# Patient Record
Sex: FEMALE | Race: WHITE | Employment: FULL TIME | URBAN - METROPOLITAN AREA
[De-identification: names, ages, dates, MRNs, and addresses within clinical notes are randomized per-mention and may not be internally consistent; named-entity substitution may affect disease eponyms.]

---

## 2021-11-02 ENCOUNTER — OFFICE VISIT (OUTPATIENT)
Dept: FAMILY MEDICINE CLINIC | Age: 31
End: 2021-11-02
Payer: COMMERCIAL

## 2021-11-02 VITALS
SYSTOLIC BLOOD PRESSURE: 122 MMHG | TEMPERATURE: 97.9 F | WEIGHT: 188 LBS | DIASTOLIC BLOOD PRESSURE: 82 MMHG | BODY MASS INDEX: 31.32 KG/M2 | OXYGEN SATURATION: 98 % | HEART RATE: 79 BPM | HEIGHT: 65 IN | RESPIRATION RATE: 18 BRPM

## 2021-11-02 DIAGNOSIS — Z76.89 ENCOUNTER TO ESTABLISH CARE WITH NEW DOCTOR: ICD-10-CM

## 2021-11-02 DIAGNOSIS — F41.9 MODERATE ANXIETY: ICD-10-CM

## 2021-11-02 DIAGNOSIS — R79.89 LOW VITAMIN D LEVEL: ICD-10-CM

## 2021-11-02 DIAGNOSIS — F32.A MODERATELY SEVERE DEPRESSION: Primary | ICD-10-CM

## 2021-11-02 DIAGNOSIS — R10.9 ABDOMINAL PAIN, UNSPECIFIED ABDOMINAL LOCATION: ICD-10-CM

## 2021-11-02 LAB
ALBUMIN SERPL-MCNC: 5 G/DL (ref 3.5–5.2)
ALP BLD-CCNC: 91 U/L (ref 35–104)
ALT SERPL-CCNC: 16 U/L (ref 0–32)
ANION GAP SERPL CALCULATED.3IONS-SCNC: 16 MMOL/L (ref 7–16)
AST SERPL-CCNC: 22 U/L (ref 0–31)
BILIRUB SERPL-MCNC: 0.4 MG/DL (ref 0–1.2)
BUN BLDV-MCNC: 11 MG/DL (ref 6–20)
CALCIUM SERPL-MCNC: 9.7 MG/DL (ref 8.6–10.2)
CHLORIDE BLD-SCNC: 101 MMOL/L (ref 98–107)
CHOLESTEROL, TOTAL: 243 MG/DL (ref 0–199)
CO2: 20 MMOL/L (ref 22–29)
CREAT SERPL-MCNC: 0.7 MG/DL (ref 0.5–1)
GFR AFRICAN AMERICAN: >60
GFR NON-AFRICAN AMERICAN: >60 ML/MIN/1.73
GLUCOSE BLD-MCNC: 114 MG/DL (ref 74–99)
HBA1C MFR BLD: 5.3 % (ref 4–5.6)
HCT VFR BLD CALC: 47.9 % (ref 34–48)
HDLC SERPL-MCNC: 37 MG/DL
HEMOGLOBIN: 16.3 G/DL (ref 11.5–15.5)
LDL CHOLESTEROL CALCULATED: 181 MG/DL (ref 0–99)
MCH RBC QN AUTO: 33.2 PG (ref 26–35)
MCHC RBC AUTO-ENTMCNC: 34 % (ref 32–34.5)
MCV RBC AUTO: 97.6 FL (ref 80–99.9)
PDW BLD-RTO: 11.9 FL (ref 11.5–15)
PLATELET # BLD: 362 E9/L (ref 130–450)
PMV BLD AUTO: 10.6 FL (ref 7–12)
POTASSIUM SERPL-SCNC: 5.2 MMOL/L (ref 3.5–5)
RBC # BLD: 4.91 E12/L (ref 3.5–5.5)
SODIUM BLD-SCNC: 137 MMOL/L (ref 132–146)
TOTAL PROTEIN: 7.3 G/DL (ref 6.4–8.3)
TRIGL SERPL-MCNC: 126 MG/DL (ref 0–149)
TSH SERPL DL<=0.05 MIU/L-ACNC: 1.17 UIU/ML (ref 0.27–4.2)
VITAMIN D 25-HYDROXY: 29 NG/ML (ref 30–100)
VLDLC SERPL CALC-MCNC: 25 MG/DL
WBC # BLD: 9.9 E9/L (ref 4.5–11.5)

## 2021-11-02 PROCEDURE — 36415 COLL VENOUS BLD VENIPUNCTURE: CPT | Performed by: FAMILY MEDICINE

## 2021-11-02 PROCEDURE — G8417 CALC BMI ABV UP PARAM F/U: HCPCS | Performed by: FAMILY MEDICINE

## 2021-11-02 PROCEDURE — 4004F PT TOBACCO SCREEN RCVD TLK: CPT | Performed by: FAMILY MEDICINE

## 2021-11-02 PROCEDURE — 99204 OFFICE O/P NEW MOD 45 MIN: CPT | Performed by: FAMILY MEDICINE

## 2021-11-02 PROCEDURE — G8484 FLU IMMUNIZE NO ADMIN: HCPCS | Performed by: FAMILY MEDICINE

## 2021-11-02 PROCEDURE — G8427 DOCREV CUR MEDS BY ELIG CLIN: HCPCS | Performed by: FAMILY MEDICINE

## 2021-11-02 RX ORDER — ETONOGESTREL 68 MG/1
68 IMPLANT SUBCUTANEOUS ONCE
COMMUNITY

## 2021-11-02 RX ORDER — HYDROXYZINE PAMOATE 25 MG/1
25 CAPSULE ORAL 2 TIMES DAILY PRN
Qty: 60 CAPSULE | Refills: 0 | Status: SHIPPED
Start: 2021-11-02 | End: 2021-12-02

## 2021-11-02 SDOH — ECONOMIC STABILITY: FOOD INSECURITY: WITHIN THE PAST 12 MONTHS, YOU WORRIED THAT YOUR FOOD WOULD RUN OUT BEFORE YOU GOT MONEY TO BUY MORE.: NEVER TRUE

## 2021-11-02 SDOH — ECONOMIC STABILITY: FOOD INSECURITY: WITHIN THE PAST 12 MONTHS, THE FOOD YOU BOUGHT JUST DIDN'T LAST AND YOU DIDN'T HAVE MONEY TO GET MORE.: NEVER TRUE

## 2021-11-02 ASSESSMENT — ENCOUNTER SYMPTOMS
ABDOMINAL PAIN: 0
SHORTNESS OF BREATH: 0
COUGH: 0
VOMITING: 0
NAUSEA: 1
CHEST TIGHTNESS: 0
CONSTIPATION: 1
ABDOMINAL DISTENTION: 0
COLOR CHANGE: 0
BACK PAIN: 0
SINUS PRESSURE: 0
BLOOD IN STOOL: 0
EYE DISCHARGE: 0
RHINORRHEA: 0
WHEEZING: 0
SORE THROAT: 0
EYE PAIN: 0
DIARRHEA: 1

## 2021-11-02 ASSESSMENT — PATIENT HEALTH QUESTIONNAIRE - PHQ9
2. FEELING DOWN, DEPRESSED OR HOPELESS: 2
5. POOR APPETITE OR OVEREATING: 3
SUM OF ALL RESPONSES TO PHQ QUESTIONS 1-9: 17
9. THOUGHTS THAT YOU WOULD BE BETTER OFF DEAD, OR OF HURTING YOURSELF: 0
3. TROUBLE FALLING OR STAYING ASLEEP: 3
SUM OF ALL RESPONSES TO PHQ QUESTIONS 1-9: 17
1. LITTLE INTEREST OR PLEASURE IN DOING THINGS: 2
SUM OF ALL RESPONSES TO PHQ QUESTIONS 1-9: 17
10. IF YOU CHECKED OFF ANY PROBLEMS, HOW DIFFICULT HAVE THESE PROBLEMS MADE IT FOR YOU TO DO YOUR WORK, TAKE CARE OF THINGS AT HOME, OR GET ALONG WITH OTHER PEOPLE: 2
4. FEELING TIRED OR HAVING LITTLE ENERGY: 3
7. TROUBLE CONCENTRATING ON THINGS, SUCH AS READING THE NEWSPAPER OR WATCHING TELEVISION: 0
SUM OF ALL RESPONSES TO PHQ9 QUESTIONS 1 & 2: 4
8. MOVING OR SPEAKING SO SLOWLY THAT OTHER PEOPLE COULD HAVE NOTICED. OR THE OPPOSITE, BEING SO FIGETY OR RESTLESS THAT YOU HAVE BEEN MOVING AROUND A LOT MORE THAN USUAL: 3
6. FEELING BAD ABOUT YOURSELF - OR THAT YOU ARE A FAILURE OR HAVE LET YOURSELF OR YOUR FAMILY DOWN: 1

## 2021-11-02 ASSESSMENT — COLUMBIA-SUICIDE SEVERITY RATING SCALE - C-SSRS
6. HAVE YOU EVER DONE ANYTHING, STARTED TO DO ANYTHING, OR PREPARED TO DO ANYTHING TO END YOUR LIFE?: NO
1. WITHIN THE PAST MONTH, HAVE YOU WISHED YOU WERE DEAD OR WISHED YOU COULD GO TO SLEEP AND NOT WAKE UP?: NO
2. HAVE YOU ACTUALLY HAD ANY THOUGHTS OF KILLING YOURSELF?: NO

## 2021-11-02 ASSESSMENT — SOCIAL DETERMINANTS OF HEALTH (SDOH): HOW HARD IS IT FOR YOU TO PAY FOR THE VERY BASICS LIKE FOOD, HOUSING, MEDICAL CARE, AND HEATING?: NOT HARD AT ALL

## 2021-11-02 NOTE — PROGRESS NOTES
[facility]        SUBJECTIVE:  CC: had concerns including New Patient (Pt here to establish care with PCP - Last PCP at [facility], does not recall name), Mass (Pt c/o lump on her RLQ, noted x1 week ago, only painful when applying pressure), and Depression (PHQ-9 score 17 (Pt's son is Autistic which she feels does not help with some of these symptoms)). HPI:  Darcia Boxer is a female 33 y.o. presented to the clinic to establish care. 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 She states there has been 8 deaths in the family in the past two years. This summer she really noticed her mood was bad, crying all the time. She usually does a yearly garden. She got it planted, but never tended to it. She reports biting her nails since she had teeth. She denies and s/h ideations. She doesn't see a counselor and has never been on medications. She reports finding a lump on her abdomen approximately a week ago. It was painful in the past, but denies pain now. She reports sometimes she gets pain with certain movements. She says her bowels are \"normal for me\" notating that she has one every other day. Her bowel movements vary from constipation to diarrhea. She denies food types affecting it. She notes having some bloating. This has all been going on since delivering her son in 2016. She gets mild episodic nausea. She trys to drink \"3 big water bottles a day. \" She reports it is what it is and states that she is a \"horrible patient\". Review of Systems   Constitutional: Negative for activity change, appetite change, fatigue and fever. HENT: Negative for congestion, postnasal drip, rhinorrhea, sinus pressure, sneezing and sore throat. Eyes: Negative for pain and discharge. Respiratory: Negative for cough, chest tightness, shortness of breath and wheezing. Cardiovascular: Negative for chest pain, palpitations and leg swelling.    Gastrointestinal: Positive for constipation, diarrhea and nausea. Negative for abdominal distention, abdominal pain, blood in stool and vomiting. Abdominal lump   Endocrine: Negative for cold intolerance and heat intolerance. Genitourinary: Negative for decreased urine volume, dysuria, frequency and urgency. Musculoskeletal: Negative for arthralgias and back pain. Skin: Negative for color change and rash. Allergic/Immunologic: Negative for food allergies and immunocompromised state. Neurological: Negative for dizziness, syncope, weakness, numbness and headaches. Psychiatric/Behavioral: Negative for agitation, behavioral problems and suicidal ideas. The patient is nervous/anxious. Depression       Outpatient Medications Marked as Taking for the 11/2/21 encounter (Office Visit) with Angelica Baptiste MD   Medication Sig Dispense Refill    etonogestrel (NEXPLANON) 68 MG implant 68 mg by Subdermal route once      sertraline (ZOLOFT) 50 MG tablet Take 1 tablet by mouth daily 90 tablet 1    hydrOXYzine (VISTARIL) 25 MG capsule Take 1 capsule by mouth 2 times daily as needed for Anxiety 60 capsule 0       I have reviewed all pertinent PMHx, PSHx, FamHx, SocialHx, medications, and allergies and updated history as appropriate. OBJECTIVE    VS: /82   Pulse 79   Temp 97.9 °F (36.6 °C) (Temporal)   Resp 18   Ht 5' 5\" (1.651 m)   Wt 188 lb (85.3 kg)   SpO2 98%   Breastfeeding No   BMI 31.28 kg/m²   Physical Exam  Constitutional:       General: She is not in acute distress. Appearance: She is well-developed. She is obese. She is not diaphoretic. HENT:      Head: Normocephalic and atraumatic. Right Ear: External ear normal.      Left Ear: External ear normal.   Eyes:      Conjunctiva/sclera: Conjunctivae normal.      Pupils: Pupils are equal, round, and reactive to light. Cardiovascular:      Rate and Rhythm: Normal rate and regular rhythm. Heart sounds: Normal heart sounds.  No murmur heard. No friction rub. No gallop. Pulmonary:      Effort: Pulmonary effort is normal.      Breath sounds: Normal breath sounds. Abdominal:      General: Bowel sounds are normal. There is no distension. Palpations: Abdomen is soft. There is no mass. Tenderness: There is abdominal tenderness. There is no right CVA tenderness, left CVA tenderness, guarding or rebound. Hernia: No hernia is present. Comments: Small lump right pelvis query cyst vs.lipoma. Right mcburney originally positive, but then negative on repeat. Negative Psoas and Rovsing sign   Musculoskeletal:         General: Normal range of motion. Cervical back: Normal range of motion and neck supple. Skin:     General: Skin is warm and dry. Neurological:      Mental Status: She is alert and oriented to person, place, and time. Psychiatric:         Behavior: Behavior normal.       ASSESSMENT/PLAN:  1. Moderately severe depression (HCC)  - sertraline (ZOLOFT) 50 MG tablet; Take 1 tablet by mouth daily  Dispense: 90 tablet; Refill: 1  - hydrOXYzine (VISTARIL) 25 MG capsule; Take 1 capsule by mouth 2 times daily as needed for Anxiety  Dispense: 60 capsule; Refill: 0    2. Moderate anxiety  #1-2 initiate Zoloft with prn Vistaril. Encourage patient to establish with a counselor. No s/h ideations. - sertraline (ZOLOFT) 50 MG tablet; Take 1 tablet by mouth daily  Dispense: 90 tablet; Refill: 1  - hydrOXYzine (VISTARIL) 25 MG capsule; Take 1 capsule by mouth 2 times daily as needed for Anxiety  Dispense: 60 capsule; Refill: 0    3. BMI 31.0-31.9,adult  Work on weight reduction with caloric restriction/well balanced diet and exercise 150 min/week as tolerated. Baseline labs as below. - CBC; Future  - Comprehensive Metabolic Panel; Future  - Hemoglobin A1C; Future  - Lipid Panel; Future  - TSH without Reflex; Future  - Vitamin D 25 Hydroxy; Future    4. Low vitamin D level  - Vitamin D 25 Hydroxy; Future    5.  Encounter to establish care with new doctor    6. Abdominal pain, unspecified abdominal location  - CTA ABDOMEN PELVIS W CONTRAST; Future    I have reviewed my findings and recommendations with Sandrita Barber MD  11/22/2021 8:55 AM  Return in about 4 weeks (around 11/30/2021). Counseled regarding above diagnosis, including possible risks and complications, especially if left uncontrolled. Patient counseled on red flag symptoms and if they occur to go to the ED. Discussed medications risk/benefits and possible side effects and alternatives to treatment. Patient and/or guardian verbalizes understanding, agrees, feels comfortable with and wishes to proceed with above treatment plan. Advised patient regarding importance of keeping up with recommended health maintenance and to schedule as soon as possible if overdue, as this is important in assessing for undiagnosed pathology, especially cancer, as well as protecting against potentially harmful/life threatening disease. Patient and/or guardian verbalizes understanding and agrees with above counseling, assessment and plan. All questions answered. Please note this report has been partially produced using speech recognition software  and may contain errors related to that system including grammar, punctuation and spelling as well as words and phrases that may seem inappropriate. If there are questions or concerns please feel free to contact me to clarify.

## 2021-11-07 ENCOUNTER — TELEPHONE (OUTPATIENT)
Dept: ADMINISTRATIVE | Age: 31
End: 2021-11-07

## 2021-11-10 ENCOUNTER — HOSPITAL ENCOUNTER (OUTPATIENT)
Dept: CT IMAGING | Age: 31
Discharge: HOME | End: 2021-11-12
Payer: COMMERCIAL

## 2021-11-10 DIAGNOSIS — R10.9 ABDOMINAL PAIN, UNSPECIFIED ABDOMINAL LOCATION: ICD-10-CM

## 2021-11-10 PROCEDURE — 74174 CTA ABD&PLVS W/CONTRAST: CPT

## 2021-11-10 PROCEDURE — 6360000004 HC RX CONTRAST MEDICATION: Performed by: RADIOLOGY

## 2021-11-10 PROCEDURE — 2580000003 HC RX 258: Performed by: RADIOLOGY

## 2021-11-10 RX ORDER — SODIUM CHLORIDE 0.9 % (FLUSH) 0.9 %
10 SYRINGE (ML) INJECTION PRN
Status: DISCONTINUED | OUTPATIENT
Start: 2021-11-10 | End: 2021-11-13 | Stop reason: HOSPADM

## 2021-11-10 RX ADMIN — IOPAMIDOL 100 ML: 755 INJECTION, SOLUTION INTRAVENOUS at 10:29

## 2021-11-10 RX ADMIN — SODIUM CHLORIDE, PRESERVATIVE FREE 10 ML: 5 INJECTION INTRAVENOUS at 10:29

## 2021-12-02 ENCOUNTER — OFFICE VISIT (OUTPATIENT)
Dept: FAMILY MEDICINE CLINIC | Age: 31
End: 2021-12-02
Payer: COMMERCIAL

## 2021-12-02 VITALS
HEART RATE: 77 BPM | OXYGEN SATURATION: 97 % | WEIGHT: 186 LBS | TEMPERATURE: 97.9 F | HEIGHT: 65 IN | BODY MASS INDEX: 30.99 KG/M2 | DIASTOLIC BLOOD PRESSURE: 68 MMHG | SYSTOLIC BLOOD PRESSURE: 122 MMHG | RESPIRATION RATE: 16 BRPM

## 2021-12-02 DIAGNOSIS — K58.2 IRRITABLE BOWEL SYNDROME WITH CONSTIPATION AND DIARRHEA: ICD-10-CM

## 2021-12-02 DIAGNOSIS — E55.9 VITAMIN D DEFICIENCY: ICD-10-CM

## 2021-12-02 DIAGNOSIS — K76.0 FATTY LIVER: ICD-10-CM

## 2021-12-02 DIAGNOSIS — Z83.79 FAMILY HISTORY OF COLITIS: ICD-10-CM

## 2021-12-02 DIAGNOSIS — K58.2 IRRITABLE BOWEL SYNDROME WITH CONSTIPATION AND DIARRHEA: Primary | ICD-10-CM

## 2021-12-02 DIAGNOSIS — Z82.49 FAMILY HISTORY OF HEART DISEASE: ICD-10-CM

## 2021-12-02 DIAGNOSIS — F41.9 MODERATE ANXIETY: ICD-10-CM

## 2021-12-02 DIAGNOSIS — G43.109 MIGRAINE WITH AURA, NOT INTRACTABLE, WITHOUT STATUS MIGRAINOSUS: ICD-10-CM

## 2021-12-02 DIAGNOSIS — Z80.8 FAMILY HISTORY OF MELANOMA: ICD-10-CM

## 2021-12-02 DIAGNOSIS — F32.A MODERATELY SEVERE DEPRESSION: ICD-10-CM

## 2021-12-02 DIAGNOSIS — E78.2 MIXED HYPERLIPIDEMIA: ICD-10-CM

## 2021-12-02 DIAGNOSIS — F12.90 MARIJUANA USE: ICD-10-CM

## 2021-12-02 DIAGNOSIS — F17.200 NICOTINE DEPENDENCE, UNCOMPLICATED, UNSPECIFIED NICOTINE PRODUCT TYPE: ICD-10-CM

## 2021-12-02 DIAGNOSIS — Z82.49 FAMILY HISTORY OF ANEURYSM: ICD-10-CM

## 2021-12-02 DIAGNOSIS — Z87.39 H/O DEGENERATIVE DISC DISEASE: ICD-10-CM

## 2021-12-02 PROCEDURE — 99215 OFFICE O/P EST HI 40 MIN: CPT | Performed by: FAMILY MEDICINE

## 2021-12-02 PROCEDURE — 4004F PT TOBACCO SCREEN RCVD TLK: CPT | Performed by: FAMILY MEDICINE

## 2021-12-02 PROCEDURE — G8484 FLU IMMUNIZE NO ADMIN: HCPCS | Performed by: FAMILY MEDICINE

## 2021-12-02 PROCEDURE — G8427 DOCREV CUR MEDS BY ELIG CLIN: HCPCS | Performed by: FAMILY MEDICINE

## 2021-12-02 PROCEDURE — G8417 CALC BMI ABV UP PARAM F/U: HCPCS | Performed by: FAMILY MEDICINE

## 2021-12-02 RX ORDER — CYANOCOBALAMIN 1000 UG/ML
1000 INJECTION INTRAMUSCULAR; SUBCUTANEOUS ONCE
COMMUNITY
End: 2021-12-02 | Stop reason: CLARIF

## 2021-12-02 ASSESSMENT — ENCOUNTER SYMPTOMS
VOMITING: 0
DIARRHEA: 1
WHEEZING: 0
COUGH: 0
NAUSEA: 0
ABDOMINAL PAIN: 0
CONSTIPATION: 1
BLOOD IN STOOL: 0
SHORTNESS OF BREATH: 0

## 2021-12-02 NOTE — PROGRESS NOTES
Conjunctiva/sclera: Conjunctivae normal.      Pupils: Pupils are equal, round, and reactive to light. Cardiovascular:      Rate and Rhythm: Normal rate and regular rhythm. Pulmonary:      Effort: Pulmonary effort is normal.      Breath sounds: Normal breath sounds. Abdominal:      General: Bowel sounds are normal. There is no distension. Palpations: Abdomen is soft. There is no mass. Tenderness: There is no abdominal tenderness. There is no guarding or rebound. Hernia: No hernia is present. Musculoskeletal:      Cervical back: Normal range of motion and neck supple. Skin:     General: Skin is warm and dry. Neurological:      Mental Status: She is alert and oriented to person, place, and time. ASSESSMENT/PLAN:  1. Irritable bowel syndrome with constipation and diarrhea  Increase hydration and fiber in diet. FODMAP diet. - Rudy Moreno MD, Gastroenterology, [place]    2. Moderately severe depression (HCC)  - sertraline (ZOLOFT) 50 MG tablet; Take a 1.5 tabs daily  Dispense: 135 tablet; Refill: 1    3. Moderate anxiety  #2-3 patient reports not tolerating Vistaril as it made her loopy. Titrate up Zoloft from 50 to 75 mg/qd at this time. Continue to monitor. No s/h ideations. Patient also reports prn Marijuana helps her anxiety. - sertraline (ZOLOFT) 50 MG tablet; Take a 1.5 tabs daily  Dispense: 135 tablet; Refill: 1    4. Family history of colitis    5. Mixed hyperlipidemia  Cholesterol and LDL are elevated as well your HDL which is your good cholesterol is low.  Recommend really working on lifestyle modifications with a well-balanced diet and exercise 150 minutes week.      6. Fatty liver    7. BMI 30.0-30.9,adult    8. H/O degenerative disc disease    9. Nicotine dependence, uncomplicated, unspecified nicotine product type  Patient did not tolerate patches (rash) or Lozenges; she didn't like the flavor. Patient is down to a 1/2 ppd from her max of 2 ppd.  Patient would like to focus on controlling #2-3 at this time to see how her cigarette smoking improves. Continue to encourage smoking cessation. 10. Vitamin D deficiency  Your vitamin D is also mildly low.  Take at 1000 units over-the-counter vitamin D daily.      11. Marijuana use    12. Family history of heart disease  Maternal uncle massive heart attack 45, maternal grandfather quadrople 55 and 62 massive heart attack, paternal grandmom quadrople bypass 52's, 48 dad heart attack. - Razia Schulz MD, Cardiology, [facility]    13. Family history of melanoma  Mom reportedly had Melanoma. Referral placed to Dermatology for skin cancer screening.   - Manoj Linares MD,  Dermatology, [place] [address] ([facility])    14. Migraine with aura, not intractable, without status migrainosus  History of Ocular migraine with associated loss of peripheral vision and floaters. Imaging in the past reported [facility] at [facility]. Records pending. Previously on Topamax. Prn Ibuprofen. Last migraine over a year ago. 13. Family History of Aneurysm  Paternal uncles anuerysm abdomen and paternal father brain aneurysm. > 40 minutes spent on visit. I have reviewed my findings and recommendations with Benny Brown MD  12/13/2021 11:02 PM  Return in about 6 weeks (around 1/13/2022). Counseled regarding above diagnosis, including possible risks and complications, especially if left uncontrolled. Patient counseled on red flag symptoms and if they occur to go to the ED. Discussed medications risk/benefits and possible side effects and alternatives to treatment. Patient and/or guardian verbalizes understanding, agrees, feels comfortable with and wishes to proceed with above treatment plan.       Advised patient regarding importance of keeping up with recommended health maintenance and to schedule as soon as possible if overdue, as this is important in assessing for undiagnosed pathology, especially cancer, as well as protecting against potentially harmful/life threatening disease. Patient and/or guardian verbalizes understanding and agrees with above counseling, assessment and plan. All questions answered. Please note this report has been partially produced using speech recognition software  and may contain errors related to that system including grammar, punctuation and spelling as well as words and phrases that may seem inappropriate. If there are questions or concerns please feel free to contact me to clarify.

## 2021-12-02 NOTE — PATIENT INSTRUCTIONS
Patient Education        Learning About the Low FODMAP Diet for Irritable Bowel Syndrome (IBS)  What is the low-FODMAP diet? A low-FODMAP diet is a way to find out what foods give you digestion problems. You stop eating certain high-FODMAP foods for about 2 months. Then you add them back to see how your body reacts. This is called a \"challenge diet. \" A dietitian or doctor can help you follow this diet. FODMAPs are carbohydrates. They are in many types of foods. FODMAP stands for:  · F ermentable. · O ligosaccharides. · D isaccharides. · M onosaccharides. · A nd p olyols. If you have digestive problems, some of these foods can make your symptoms worse. When you are on this diet, you can still eat certain fruits and vegetables. You can also eat certain grains, meats, fish, and lactose-free milks. What is it used for? If you have irritable bowel syndrome (IBS), you can ease your symptoms by not eating some types of foods. Some people also use this diet for inflammatory bowel disease (IBD) or some food intolerances. High-FODMAP foods can be hard to digest. They pull more fluid into your intestines. They are also easily fermented. This can lead to bloating, belly pain, gas, and diarrhea. The low-FODMAP diet can help you figure out what foods to avoid. And it can help you find foods that are easier to digest.  This diet can help with symptoms of some digestive diseases. But it's not a cure. You will still need to manage your condition. How does it work? You will work with a doctor or dietitian when you start the diet. At first, you won't eat any high-FODMAP foods for a few weeks. Go to www.[employer] to learn more about this diet. Simba Velasquez also find links to an miriam for your phone or other device. You'll find low-FODMAP cookbooks there too. After 6 to 8 weeks, you will start to try high-FODMAP foods again. You will add those foods back to your diet, one group at a time.  Your doctor or dietitian will probably have you wait a few days before you add each new group of those foods. Keep a food diary. You can write down the foods you try and note how they make you feel. After a few weeks, you may have a better idea of what foods you should avoid and what foods make you feel your best.  What are the risks? There is some risk of not getting all of the vitamins and nutrients you need on the low-FODMAP diet. These include:  · Folate. · Thiamin. · Vitamin B6.  · Calcium. · Vitamin D. Your dietitian or doctor can help you find other sources of these if needed. This diet may limit your fiber intake. Try to plan your meals to include other sources of fiber. What foods are on the low-FODMAP diet? Here is a guide to foods that you can eat, plus the foods that you should avoid, when you are on the low-FODMAP diet. Grains  Okay to eat: Foods made from grains like arrowroot, buckwheat, corn, millet, and oats. You can also eat potato, quinoa, rice, sorghum, tapioca, and teff. Cereals, pasta, breads, corn tortillas and baked goods made from these grains are also okay. (These grains may be labeled \"gluten-free. \")  Avoid: Grains like wheat, barley, and rye. Avoid ingredients such as bulgur, couscous, durum, and semolina. And avoid cereals, breads, and pastas made from these grains. Avoid chickpea, lentil, and pea flour. Proteins  Okay to eat: Most meat, fish, and eggs without high-FODMAP sauces. You can have small amounts of almonds or hazelnuts (10 nuts). Macadamia nuts, peanuts, pecans, pine nuts, and walnuts are also okay. You can also eat erik and pumpkin seeds, tofu, and tempeh. Avoid: Beans, chickpeas, lentils, and soybeans. Avoid pistachio and cashew nuts. And some sausages may have high-FODMAP ingredients. Dairy  Okay to eat: Lactose-free dairy milks. Rice milk and almond milk are okay. So are lactose-free yogurts, kefirs, ice creams, and sorbet from low-FODMAP fruits and sweeteners.  (These are often labeled 2020               Content Version: 13.0  © 5095-7567 [employer]wise[employer]. Care instructions adapted under license by [facility] ([facility]). If you have questions about a medical condition or this instruction, always ask your healthcare professional. [address] any warranty or liability for your use of this information.

## 2021-12-03 PROBLEM — E66.9 MODERATE OBESITY: Status: ACTIVE | Noted: 2021-12-03

## 2021-12-03 PROBLEM — E66.8 MODERATE OBESITY: Status: ACTIVE | Noted: 2021-12-03

## 2021-12-03 PROBLEM — R00.2 PALPITATIONS: Status: ACTIVE | Noted: 2021-12-03

## 2021-12-06 LAB
ALLERGEN BARLEY IGE: <0.1 KU/L
ALLERGEN BEEF: <0.1 KU/L
ALLERGEN CABBAGE IGE: <0.1 KU/L
ALLERGEN CARROT IGE: <0.1 KU/L
ALLERGEN CHICKEN IGE: <0.1 KU/L
ALLERGEN CODFISH IGE: <0.1 KU/L
ALLERGEN CORN IGE: <0.1 KU/L
ALLERGEN COW MILK IGE: <0.1 KU/L
ALLERGEN CRAB IGE: <0.1 KU/L
ALLERGEN EGG WHITE IGE: <0.1 KU/L
ALLERGEN GRAPE IGE: <0.1 KU/L
ALLERGEN LETTUCE IGE: <0.1 KU/L
ALLERGEN NAVY BEAN: <0.1 KU/L
ALLERGEN OAT: <0.1 KU/L
ALLERGEN ORANGE IGE: <0.1 KU/L
ALLERGEN PEANUT (F13) IGE: <0.1 KU/L
ALLERGEN PEPPER C. ANNUUM IGE: <0.1 KU/L
ALLERGEN PORK: <0.1 KU/L
ALLERGEN RICE IGE: <0.1 KU/L
ALLERGEN RYE IGE: <0.1 KU/L
ALLERGEN SOYBEAN IGE: <0.1 KU/L
ALLERGEN TOMATO IGE: <0.1 KU/L
ALLERGEN TUNA IGE: <0.1 KU/L
ALLERGEN WHEAT IGE: <0.1 KU/L
IGE: 37 IU/ML
POTATO, IGE: <0.1 KU/L
SHRIMP: <0.1 KU/L

## 2021-12-13 PROBLEM — Z82.49 FAMILY HISTORY OF ANEURYSM: Status: ACTIVE | Noted: 2021-12-13

## 2021-12-17 ENCOUNTER — OFFICE VISIT (OUTPATIENT)
Dept: GASTROENTEROLOGY | Age: 31
End: 2021-12-17
Payer: COMMERCIAL

## 2021-12-17 VITALS
BODY MASS INDEX: 30.66 KG/M2 | SYSTOLIC BLOOD PRESSURE: 122 MMHG | HEIGHT: 65 IN | DIASTOLIC BLOOD PRESSURE: 72 MMHG | WEIGHT: 184 LBS

## 2021-12-17 DIAGNOSIS — K59.00 CONSTIPATION, UNSPECIFIED CONSTIPATION TYPE: Primary | ICD-10-CM

## 2021-12-17 DIAGNOSIS — R11.0 NAUSEA: ICD-10-CM

## 2021-12-17 DIAGNOSIS — K21.9 GASTROESOPHAGEAL REFLUX DISEASE WITHOUT ESOPHAGITIS: ICD-10-CM

## 2021-12-17 DIAGNOSIS — K59.00 CONSTIPATION, UNSPECIFIED CONSTIPATION TYPE: ICD-10-CM

## 2021-12-17 DIAGNOSIS — R19.7 DIARRHEA, UNSPECIFIED TYPE: ICD-10-CM

## 2021-12-17 DIAGNOSIS — K76.0 FATTY LIVER: ICD-10-CM

## 2021-12-17 LAB
ALBUMIN SERPL-MCNC: 4.9 G/DL (ref 3.5–5.2)
ALP BLD-CCNC: 98 U/L (ref 35–104)
ALT SERPL-CCNC: 12 U/L (ref 0–32)
AST SERPL-CCNC: 18 U/L (ref 0–31)
BILIRUB SERPL-MCNC: 0.2 MG/DL (ref 0–1.2)
BILIRUBIN DIRECT: <0.2 MG/DL (ref 0–0.3)
BILIRUBIN, INDIRECT: NORMAL MG/DL (ref 0–1)
TOTAL PROTEIN: 7.7 G/DL (ref 6.4–8.3)

## 2021-12-17 PROCEDURE — 99203 OFFICE O/P NEW LOW 30 MIN: CPT | Performed by: NURSE PRACTITIONER

## 2021-12-17 RX ORDER — FAMOTIDINE 20 MG/1
20 TABLET, FILM COATED ORAL 2 TIMES DAILY
Qty: 180 TABLET | Refills: 1 | Status: SHIPPED | OUTPATIENT
Start: 2021-12-17

## 2021-12-17 RX ORDER — M-VIT,TX,IRON,MINS/CALC/FOLIC 27MG-0.4MG
1 TABLET ORAL DAILY
COMMUNITY

## 2021-12-17 RX ORDER — FAMOTIDINE 20 MG
TABLET ORAL
COMMUNITY

## 2021-12-17 NOTE — PROGRESS NOTES
Terri Moffett (:  1990) is a 33 y.o. female, here for evaluation of the following chief complaint(s):  GI Problem (ref from dr Latha Zarate for IBS)      SUBJECTIVE/OBJECTIVE:  HPI:    Umm Luna is a very pleasant 33year old female that presents today with complaints of nausea and acid reflux. Notice nausea is food specific. It is worse in the mornings. TUMS and Pepto bismol do not satisfy. Recent allergy testing was negative. Maternal aunt and uncle have celiac sprue disease along with 2 cousins. The heartburn is worse when laying down. There is upper abdominal pain with meals on occasion. Patient is a smoker. No routine NSAID use and no alcohol. Bowels alternate between loose and hard x 8 months. There is a long history of constipation that has worsened as of recent. No family history of CRC or IBD. Recent CT scan was negative but suspicious for fatty liver. ROS:  General: Patient denies n/v/f/c or weight loss. HEENT: Patient denies persistent postnasal drip, scleral icterus, drooling, persistent bleeding from nose/mouth. Resp: Patient denies SOB, wheezing, productive cough. Cards: Patient denies CP, palpitations, significant edema  GI: As above. Derm: Patient denies jaundice/rashes. Musc: Patient denies diffuse/irregular joint swelling or myalgias. Objective   Wt Readings from Last 3 Encounters:   21 184 lb (83.5 kg)   21 186 lb (84.4 kg)   21 188 lb (85.3 kg)     Temp Readings from Last 3 Encounters:   21 97.9 °F (36.6 °C)   21 97.9 °F (36.6 °C) (Temporal)     BP Readings from Last 3 Encounters:   21 122/72   21 122/68   21 122/82     Pulse Readings from Last 3 Encounters:   21 77   21 79   16 77        Physical Exam  Abdominal:      General: Bowel sounds are normal.      Palpations: Abdomen is soft.          Past Medical History:   Diagnosis Date    Fatty liver 2021    Migraines ocular    Moderately severe depression ([facility] [address] 11/02/2021    Other hyperlipidemia       Past Surgical History:   Procedure Laterality Date    CARPAL TUNNEL RELEASE Right 2019    WISDOM TOOTH EXTRACTION        Family History   Problem Relation Age of Onset    Breast Cancer Mother     Alcohol Abuse Father     Heart Attack Father     Dementia Father     Hypertension Father     Drug Abuse Brother     Diabetes Maternal Grandmother     Breast Cancer Paternal Aunt     Autism Child     Colon Cancer Neg Hx     Ovarian Cancer Neg Hx     Uterine Cancer Neg Hx         Lab Results   Component Value Date    WBC 9.9 11/02/2021    HGB 16.3 (H) 11/02/2021    HCT 47.9 11/02/2021    MCV 97.6 11/02/2021     11/02/2021      Lab Results   Component Value Date     11/02/2021    K 5.2 (H) 11/02/2021     11/02/2021    CO2 20 (L) 11/02/2021    BUN 11 11/02/2021    CREATININE 0.7 11/02/2021    GLUCOSE 114 (H) 11/02/2021    CALCIUM 9.7 11/02/2021    PROT 7.3 11/02/2021    LABALBU 5.0 11/02/2021    BILITOT 0.4 11/02/2021    ALKPHOS 91 11/02/2021    AST 22 11/02/2021    ALT 16 11/02/2021    LABGLOM >60 11/02/2021    GFRAA >60 11/02/2021                       ASSESSMENT/PLAN:    1. Constipation, unspecified constipation type  -     Tissue Transglutaminase, IgA; Future  -     IgA; Future  -     Hepatic Function Panel; Future  -     XR ABDOMEN (KUB) (SINGLE AP VIEW); Future    Labs ordered to rule out celiac sprue disease due to patients family history. Abdominal xray ordered also. Once the xray is reviewed, will determine further treatment. 2. Diarrhea, unspecified type  -     Tissue Transglutaminase, IgA; Future  -     IgA; Future  -     Hepatic Function Panel; Future  -     XR ABDOMEN (KUB) (SINGLE AP VIEW); Future    Celiac sprue testing ordered. Await results of abdominal xray. 3. Gastroesophageal reflux disease without esophagitis  -     Tissue Transglutaminase, IgA; Future  -     IgA;  Future  - Hepatic Function Panel; Future    Patient is a smoker making her higher risk for diseases of the esophagus and stomach. Will proceed with EGD to rule out gastropathy. Patient is agreeable. Procedure literature given. The risks and alternatives were explained as per ASGE guidelines (I.  E.  Perforation, 3% chance of bleeding, reaction to medication, infection, and death). 4. Nausea  -     Tissue Transglutaminase, IgA; Future  -     IgA; Future  -     Hepatic Function Panel; Future    5. Fatty liver    Incidental finding. Hepatic panel ordered. Return for Follow up with Dr. Herberth Ruff post procedure. An electronic signature was used to authenticate this note.     --Osito Yancey, AMANDA - CNP

## 2021-12-18 LAB — IGA: 270 MG/DL (ref 70–400)

## 2021-12-22 ENCOUNTER — TELEPHONE (OUTPATIENT)
Dept: GASTROENTEROLOGY | Age: 31
End: 2021-12-22

## 2021-12-22 ENCOUNTER — OFFICE VISIT (OUTPATIENT)
Dept: CARDIOLOGY CLINIC | Age: 31
End: 2021-12-22
Payer: COMMERCIAL

## 2021-12-22 VITALS
HEIGHT: 65 IN | HEART RATE: 70 BPM | DIASTOLIC BLOOD PRESSURE: 72 MMHG | OXYGEN SATURATION: 98 % | BODY MASS INDEX: 31.16 KG/M2 | RESPIRATION RATE: 16 BRPM | SYSTOLIC BLOOD PRESSURE: 114 MMHG | WEIGHT: 187 LBS

## 2021-12-22 DIAGNOSIS — E78.00 PURE HYPERCHOLESTEROLEMIA: ICD-10-CM

## 2021-12-22 DIAGNOSIS — R00.2 PALPITATIONS: ICD-10-CM

## 2021-12-22 DIAGNOSIS — E66.8 MODERATE OBESITY: ICD-10-CM

## 2021-12-22 DIAGNOSIS — R07.89 ATYPICAL CHEST PAIN: Primary | ICD-10-CM

## 2021-12-22 LAB — TISSUE TRANSGLUTAMINASE IGA: 0.9 U/ML

## 2021-12-22 PROCEDURE — 99244 OFF/OP CNSLTJ NEW/EST MOD 40: CPT | Performed by: INTERNAL MEDICINE

## 2021-12-22 PROCEDURE — G8417 CALC BMI ABV UP PARAM F/U: HCPCS | Performed by: INTERNAL MEDICINE

## 2021-12-22 PROCEDURE — G8484 FLU IMMUNIZE NO ADMIN: HCPCS | Performed by: INTERNAL MEDICINE

## 2021-12-22 PROCEDURE — 93000 ELECTROCARDIOGRAM COMPLETE: CPT | Performed by: INTERNAL MEDICINE

## 2021-12-22 PROCEDURE — G8427 DOCREV CUR MEDS BY ELIG CLIN: HCPCS | Performed by: INTERNAL MEDICINE

## 2021-12-22 RX ORDER — ATORVASTATIN CALCIUM 20 MG/1
20 TABLET, FILM COATED ORAL DAILY
Qty: 30 TABLET | Refills: 3 | Status: SHIPPED
Start: 2021-12-22 | End: 2022-05-09

## 2021-12-22 NOTE — TELEPHONE ENCOUNTER
Prior Authorization Form:      DEMOGRAPHICS:                     Patient Name:  Padmini Leger  Patient :  1990            Insurance:  Payor: Sunshine Azevedo / Plan: Romero [employer]ounds / Product Type: *No Product type* /   Insurance ID Number:    Payor/Plan Subscr  Sex Relation Sub.  Ins. ID Effective Group Num   1. [facility] Floor 1990 Female Self [id number] [address] [facility] [address]         DIAGNOSIS & PROCEDURE:                       Procedure/Operation: EGD           CPT Code: 12661    Diagnosis:  GERD    ICD10 Code: K21.9    Location:  [address] [facility]    Surgeon:  Dr. Js Hawkins    SCHEDULING INFORMATION:                          Date: 2022    Time: 845am              Anesthesia:  MAC/TIVA                                                       Status:  Outpatient               Electronically signed by Solange Acharya MA on 2021 at 10:57 AM

## 2021-12-22 NOTE — PROGRESS NOTES
OUTPATIENT CARDIOLOGY CONSULT    Name: Elizabeth Pelletier    Age: 33 y.o. Date of Service: 12/22/2021    Reason for Consultation: Atypical chest pain, palpitations, moderate obesity    Referring Physician: Jama Mulligan MD    History of Present Illness: The patient is a 27-year-old white female with no known history of structural heart disease and a risk profile of recently noted hyperlipidemia as well as that of a family history with multiple first-degree relatives with premature atherosclerotic vascular disease as well as that of moderate obesity. She presents with symptoms of randomly occurring transient sharp stabbing precordial chest discomfort lasting seconds in duration without ischemic descriptors, radiation or associated ischemic equivalents. She additionally denies manifestations of decompensated left ventricular systolic dysfunction or volume overload. She additionally relates palpitations described as the sensation of an occasional rapid heartbeat in the absence of additional arrhythmia related symptomatology. At the time of evaluation she is normotensive. Review of Systems: The remainder of a complete multisystem review including consitutional, central nervous, respiratory, circulatory, gastrointestinal, genitourinary, endocrinologic, hematologic, musculoskeletal and psychiatric are negative.     Past Medical History:  Past Medical History:   Diagnosis Date    Fatty liver 12/02/2021    Migraines     ocular    Moderately severe depression ([facility] [address] 11/02/2021    Other hyperlipidemia        Past Surgical History:  Past Surgical History:   Procedure Laterality Date    CARPAL TUNNEL RELEASE Right 2019    WISDOM TOOTH EXTRACTION         Family History:  Family History   Problem Relation Age of Onset    Breast Cancer Mother     Alcohol Abuse Father     Heart Attack Father     Dementia Father     Hypertension Father     Drug Abuse Brother     Diabetes Maternal Grandmother      Physically Abused: Not on file    Sexually Abused: Not on file   Housing Stability:     Unable to Pay for Housing in the Last Year: Not on file    Number of Places Lived in the Last Year: Not on file    Unstable Housing in the Last Year: Not on file       Allergies:  No Known Allergies    Current Medications:  Current Outpatient Medications   Medication Sig Dispense Refill    Multiple Vitamins-Minerals (THERAPEUTIC MULTIVITAMIN-MINERALS) tablet Take 1 tablet by mouth daily      Vitamin D, Cholecalciferol, 25 MCG (1000 UT) CAPS Take by mouth      Probiotic Product (PROBIOTIC DAILY PO) Take by mouth      famotidine (PEPCID) 20 MG tablet Take 1 tablet by mouth 2 times daily PRN for breakthrough reflux symptoms 180 tablet 1    sertraline (ZOLOFT) 50 MG tablet Take a 1.5 tabs daily (Patient taking differently: Take 75 mg by mouth daily Take a 1.5 tabs daily) 135 tablet 1    etonogestrel (NEXPLANON) 68 MG implant 68 mg by Subdermal route once       No current facility-administered medications for this visit. Physical Exam:  /72   Pulse 70   Resp 16   Ht 5' 5\" (1.651 m)   Wt 187 lb (84.8 kg)   LMP  (LMP Unknown)   SpO2 98%   BMI 31.12 kg/m²   Wt Readings from Last 3 Encounters:   12/22/21 187 lb (84.8 kg)   12/17/21 184 lb (83.5 kg)   12/02/21 186 lb (84.4 kg)     The patient is awake, alert and in no discomfort or distress. No gross musculoskeletal deformity or lymphadenopathy are present. No significant skin or nail changes are present. Gross examination of head, eyes, nose and throat are negative. Jugular venous pressure is normal and no carotid bruits are present. No thyromegaly is noted. Normal respiratory effort is noted with no accessory muscle usage present. Lung fields are clear to ascultation. Cardiac examination is notable for a regular rate and rhythm with no palpable thrill. No gallop rhythm or cardiac murmur are identified.  A benign abdominal examination is present with no masses or organomegaly. A normal abdominal aortic pulsation is present. Intact pulses are present throughout all extremities and no peripheral edema is present. No focal neurologic deficits are present. Laboratory Tests:  Lab Results   Component Value Date    CREATININE 0.7 11/02/2021    BUN 11 11/02/2021     11/02/2021    K 5.2 (H) 11/02/2021     11/02/2021    CO2 20 (L) 11/02/2021     No results found for: BNP  Lab Results   Component Value Date    WBC 9.9 11/02/2021    RBC 4.91 11/02/2021    HGB 16.3 11/02/2021    HCT 47.9 11/02/2021    MCV 97.6 11/02/2021    MCH 33.2 11/02/2021    MCHC 34.0 11/02/2021    RDW 11.9 11/02/2021     11/02/2021    MPV 10.6 11/02/2021     No results for input(s): ALKPHOS, ALT, AST, PROT, BILITOT, BILIDIR, LABALBU in the last 72 hours. No results found for: MG  No results found for: PROTIME, INR  Lab Results   Component Value Date    TSH 1.170 11/02/2021     No components found for: CHLPL  Lab Results   Component Value Date    TRIG 126 11/02/2021     Lab Results   Component Value Date    HDL 37 11/02/2021     Lab Results   Component Value Date    LDLCALC 181 (H) 11/02/2021       Cardiac Tests:  ECG: A resting electrocardiogram demonstrates evidence of sinus rhythm and is otherwise unremarkable      ASSESSMENT / PLAN: On a clinical basis, the patient presents with symptoms of atypically described chest discomfort and palpitations neither presently of concern from a cardiovascular standpoint. I have extensively discussed this with her and have not recommended additional cardiovascular evaluation. I discussed the benefits of appropriate lifestyle modification inclusive of weight reduction to benefit diastolic cardiac performance as well as reducing risk of the development of obstructive sleep apnea with associated adverse cardiovascular effects.   In light of her atherosclerotic risk, I have recommended the initiation of management of her serum lipids based on present LDL cholesterol levels with the initiation of atorvastatin 20 mg daily and recommended follow-up which I will defer to your discretion. Continued aggressive risk factor modification of blood pressure and serum lipids will assist in reducing risk of future atherosclerotic development. I will present return her to your care and would happily reevaluate her in the future should additional cardiovascular difficulties or concerns arise. Follow-up office visit as needed should additional cardiovascular difficulties or concerns arise    Thank you for allowing me to participate in your patient's care. Please feel free to contact me if you have any questions or concerns. Note: This report was completed utilizing a computerized voice recognition software. Every effort has been made to insure accuracy, however; inadvertent computerized transcription errors may be present. Galilea Ayala.  Arvind Maldonado, [address] [facility]    An electronic copy of this consult note was forwarded to Dr. Johny Bang

## 2022-02-16 ENCOUNTER — TELEPHONE (OUTPATIENT)
Dept: GASTROENTEROLOGY | Age: 32
End: 2022-02-16

## 2022-02-16 NOTE — TELEPHONE ENCOUNTER
Prior Authorization Form:      DEMOGRAPHICS:                     Patient Name:  Delmis Adams  Patient :  1990            Insurance:  Payor: Ranjan Samples / Plan: Leydi Snare / Product Type: *No Product type* /   Insurance ID Number:    Payor/Plan Subscr  Sex Relation Sub.  Ins. ID Effective Group Num   1. CORIE Ardon 1990 Female Self [id number] [address] [facility] [address]         DIAGNOSIS & PROCEDURE:                       Procedure/Operation: EGD           CPT Code: 42330    Diagnosis:  GERD    ICD10 Code: K21.9    Location:  [facility]    Surgeon:  Dr. Aiyana Chauhan     SCHEDULING INFORMATION:                          Date: 2022    Time: 1200              Anesthesia:  MAC/TIVA                                                       Status:  Outpatient          Electronically signed by Brenton Billingsley MA on 2022 at 2:18 PM

## 2022-02-17 ENCOUNTER — OFFICE VISIT (OUTPATIENT)
Dept: FAMILY MEDICINE CLINIC | Age: 32
End: 2022-02-17
Payer: COMMERCIAL

## 2022-02-17 VITALS
HEART RATE: 87 BPM | TEMPERATURE: 98 F | BODY MASS INDEX: 31.49 KG/M2 | HEIGHT: 65 IN | WEIGHT: 189 LBS | SYSTOLIC BLOOD PRESSURE: 124 MMHG | OXYGEN SATURATION: 97 % | DIASTOLIC BLOOD PRESSURE: 80 MMHG | RESPIRATION RATE: 16 BRPM

## 2022-02-17 DIAGNOSIS — K21.9 GASTROESOPHAGEAL REFLUX DISEASE, UNSPECIFIED WHETHER ESOPHAGITIS PRESENT: ICD-10-CM

## 2022-02-17 DIAGNOSIS — K59.00 CONSTIPATION, UNSPECIFIED CONSTIPATION TYPE: ICD-10-CM

## 2022-02-17 DIAGNOSIS — Z82.49 FAMILY HISTORY OF EARLY CAD: ICD-10-CM

## 2022-02-17 DIAGNOSIS — N30.00 ACUTE CYSTITIS WITHOUT HEMATURIA: ICD-10-CM

## 2022-02-17 DIAGNOSIS — E78.2 MIXED HYPERLIPIDEMIA: ICD-10-CM

## 2022-02-17 DIAGNOSIS — N30.00 ACUTE CYSTITIS WITHOUT HEMATURIA: Primary | ICD-10-CM

## 2022-02-17 DIAGNOSIS — F32.A MODERATELY SEVERE DEPRESSION: ICD-10-CM

## 2022-02-17 DIAGNOSIS — F41.9 MODERATE ANXIETY: ICD-10-CM

## 2022-02-17 LAB
BILIRUBIN, POC: NEGATIVE
BLOOD URINE, POC: NEGATIVE
CLARITY, POC: CLEAR
COLOR, POC: YELLOW
CONTROL: PRESENT
GLUCOSE URINE, POC: NEGATIVE
KETONES, POC: NEGATIVE
LEUKOCYTE EST, POC: NORMAL
NITRITE, POC: NEGATIVE
PH, POC: 6.5
PREGNANCY TEST URINE, POC: NEGATIVE
PROTEIN, POC: NEGATIVE
SPECIFIC GRAVITY, POC: 1.01
UROBILINOGEN, POC: 0.2

## 2022-02-17 PROCEDURE — 81002 URINALYSIS NONAUTO W/O SCOPE: CPT | Performed by: FAMILY MEDICINE

## 2022-02-17 PROCEDURE — G8417 CALC BMI ABV UP PARAM F/U: HCPCS | Performed by: FAMILY MEDICINE

## 2022-02-17 PROCEDURE — G8427 DOCREV CUR MEDS BY ELIG CLIN: HCPCS | Performed by: FAMILY MEDICINE

## 2022-02-17 PROCEDURE — 81003 URINALYSIS AUTO W/O SCOPE: CPT | Performed by: FAMILY MEDICINE

## 2022-02-17 PROCEDURE — 4004F PT TOBACCO SCREEN RCVD TLK: CPT | Performed by: FAMILY MEDICINE

## 2022-02-17 PROCEDURE — 99214 OFFICE O/P EST MOD 30 MIN: CPT | Performed by: FAMILY MEDICINE

## 2022-02-17 PROCEDURE — G8484 FLU IMMUNIZE NO ADMIN: HCPCS | Performed by: FAMILY MEDICINE

## 2022-02-17 PROCEDURE — 81025 URINE PREGNANCY TEST: CPT | Performed by: FAMILY MEDICINE

## 2022-02-17 RX ORDER — SERTRALINE HYDROCHLORIDE 100 MG/1
TABLET, FILM COATED ORAL
Qty: 90 TABLET | Refills: 1 | Status: SHIPPED
Start: 2022-02-17 | End: 2022-08-10

## 2022-02-17 RX ORDER — POLYETHYLENE GLYCOL 3350 17 G/17G
17 POWDER, FOR SOLUTION ORAL DAILY
COMMUNITY

## 2022-02-17 RX ORDER — SULFAMETHOXAZOLE AND TRIMETHOPRIM 800; 160 MG/1; MG/1
1 TABLET ORAL 2 TIMES DAILY
Qty: 6 TABLET | Refills: 0 | Status: SHIPPED | OUTPATIENT
Start: 2022-02-17 | End: 2022-02-20

## 2022-02-17 ASSESSMENT — ENCOUNTER SYMPTOMS
WHEEZING: 0
CONSTIPATION: 0
DIARRHEA: 0
SHORTNESS OF BREATH: 0
VOMITING: 0
NAUSEA: 0
COUGH: 0
ABDOMINAL PAIN: 0

## 2022-02-17 NOTE — PROGRESS NOTES
[facility])  Family Medicine Outpatient        SUBJECTIVE:  CC: had concerns including Dysuria (Burning with urination, frequent urination, low back pain) and Depression (Zoloft dosage may need increased. ). HPI:  King Dejesus is a female 33 y.o. presented to the clinic to follow up on her anxiety and depression. She has been doing ok on the 75 mg/qd of Zoloft, but feels it needs to be increased. She feels like she doesn't have time for counseling. She has good support with her . Her brother recently got arrested and she is dealing with behavioral problems with her son. She denies any s/h ideations. She has a Nexplanon in place that was placed in 2019. She doesn't get regular periods with this and has been over a year. Review of Systems   Constitutional: Negative for appetite change, fatigue and fever. Respiratory: Negative for cough, shortness of breath and wheezing. Cardiovascular: Negative for chest pain and palpitations. Gastrointestinal: Negative for abdominal pain, constipation, diarrhea, nausea and vomiting. Gerd   Genitourinary: Positive for dysuria and frequency. Negative for urgency. Psychiatric/Behavioral: Negative for suicidal ideas. The patient is nervous/anxious.          Depression       Outpatient Medications Marked as Taking for the 22 encounter (Office Visit) with Opal Escobedo MD   Medication Sig Dispense Refill    polyethylene glycol (MIRALAX) 17 g PACK packet Take 17 g by mouth daily      sertraline (ZOLOFT) 100 MG tablet Take a 1 tab daily 90 tablet 1    [] sulfamethoxazole-trimethoprim (BACTRIM DS;SEPTRA DS) 800-160 MG per tablet Take 1 tablet by mouth 2 times daily for 3 days 6 tablet 0    atorvastatin (LIPITOR) 20 MG tablet Take 1 tablet by mouth daily 30 tablet 3    Multiple Vitamins-Minerals (THERAPEUTIC MULTIVITAMIN-MINERALS) tablet Take 1 tablet by mouth daily      Vitamin D, Cholecalciferol, 25 MCG (1000 UT) CAPS Take by mouth      Probiotic Product (PROBIOTIC DAILY PO) Take by mouth      famotidine (PEPCID) 20 MG tablet Take 1 tablet by mouth 2 times daily PRN for breakthrough reflux symptoms 180 tablet 1    etonogestrel (NEXPLANON) 68 MG implant 68 mg by Subdermal route once         I have reviewed all pertinent PMHx, PSHx, FamHx, SocialHx, medications, and allergies and updated history as appropriate. OBJECTIVE    VS: /80   Pulse 87   Temp 98 °F (36.7 °C)   Resp 16   Ht 5' 5\" (1.651 m)   Wt 189 lb (85.7 kg)   SpO2 97%   Breastfeeding No Comment: nexplanon - no cycles  BMI 31.45 kg/m²   Physical Exam  Constitutional:       General: She is not in acute distress. Appearance: She is well-developed. She is obese. She is not diaphoretic. HENT:      Head: Normocephalic and atraumatic. Eyes:      Conjunctiva/sclera: Conjunctivae normal.      Pupils: Pupils are equal, round, and reactive to light. Cardiovascular:      Rate and Rhythm: Normal rate and regular rhythm. Pulmonary:      Effort: Pulmonary effort is normal.      Breath sounds: Normal breath sounds. Abdominal:      General: Bowel sounds are normal. There is no distension. Palpations: Abdomen is soft. Tenderness: There is no abdominal tenderness. Hernia: No hernia is present. Musculoskeletal:      Cervical back: Normal range of motion and neck supple. Skin:     General: Skin is warm and dry. Neurological:      Mental Status: She is alert and oriented to person, place, and time. ASSESSMENT/PLAN:  1. Acute cystitis without hematuria  Trace le and negative pregnancy test. Ucx sent. Rx sent for Bactrim. - POCT Urinalysis no Micro  - Culture, Urine; Future  - POCT urine pregnancy  - sulfamethoxazole-trimethoprim (BACTRIM DS;SEPTRA DS) 800-160 MG per tablet; Take 1 tablet by mouth 2 times daily for 3 days  Dispense: 6 tablet; Refill: 0    2. Moderately severe depression (HCC)  - sertraline (ZOLOFT) 100 MG tablet;  Take a 1 tab daily  Dispense: 90 tablet; Refill: 1    3. Moderate anxiety  #2/3 titrate up Zoloft at this time. - sertraline (ZOLOFT) 100 MG tablet; Take a 1 tab daily  Dispense: 90 tablet; Refill: 1    4. Constipation, unspecified constipation type  Miralax    5. Gastroesophageal reflux disease, unspecified whether esophagitis present  Following with GI. EGD pending. Reports doing well. 6. Mixed hyperlipidemia    7. Family history of early CAD  Saw Dr. Paula Weiss 12/2021. No further testing recommended. Initiated on Statin. I have reviewed my findings and recommendations with Gomez Doherty MD  2/27/2022 10:56 AM  Return in about 6 weeks (around 3/31/2022). Counseled regarding above diagnosis, including possible risks and complications, especially if left uncontrolled. Patient counseled on red flag symptoms and if they occur to go to the ED. Discussed medications risk/benefits and possible side effects and alternatives to treatment. Patient and/or guardian verbalizes understanding, agrees, feels comfortable with and wishes to proceed with above treatment plan. Advised patient regarding importance of keeping up with recommended health maintenance and to schedule as soon as possible if overdue, as this is important in assessing for undiagnosed pathology, especially cancer, as well as protecting against potentially harmful/life threatening disease. Patient and/or guardian verbalizes understanding and agrees with above counseling, assessment and plan. All questions answered. Please note this report has been partially produced using speech recognition software  and may contain errors related to that system including grammar, punctuation and spelling as well as words and phrases that may seem inappropriate. If there are questions or concerns please feel free to contact me to clarify.

## 2022-02-17 NOTE — PATIENT INSTRUCTIONS
Alphonzo Burkitt, Counselor, [place], [place], [address] com  . .. [address] [facility], Counselor, [place], [place], [address], [phone]    [address] [facility]. [address] [place] [address]. Naima Stephenson [facility]. [phone].     Joshua Arias [address] [place] [address] · [phone]

## 2022-02-19 LAB — URINE CULTURE, ROUTINE: NORMAL

## 2022-05-09 RX ORDER — ATORVASTATIN CALCIUM 20 MG/1
TABLET, FILM COATED ORAL
Qty: 30 TABLET | Refills: 3 | Status: SHIPPED
Start: 2022-05-09 | End: 2022-11-01

## 2022-06-30 DIAGNOSIS — F32.A MODERATELY SEVERE DEPRESSION: ICD-10-CM

## 2022-06-30 DIAGNOSIS — F41.9 MODERATE ANXIETY: ICD-10-CM

## 2022-08-10 DIAGNOSIS — F32.A MODERATELY SEVERE DEPRESSION: ICD-10-CM

## 2022-08-10 DIAGNOSIS — F41.9 MODERATE ANXIETY: ICD-10-CM

## 2022-08-10 RX ORDER — SERTRALINE HYDROCHLORIDE 100 MG/1
TABLET, FILM COATED ORAL
Qty: 90 TABLET | Refills: 1 | Status: SHIPPED | OUTPATIENT
Start: 2022-08-10

## 2022-10-21 ENCOUNTER — HOSPITAL ENCOUNTER (OUTPATIENT)
Age: 32
Setting detail: OUTPATIENT SURGERY
Discharge: HOME | End: 2022-10-21
Attending: STUDENT IN AN ORGANIZED HEALTH CARE EDUCATION/TRAINING PROGRAM | Admitting: STUDENT IN AN ORGANIZED HEALTH CARE EDUCATION/TRAINING PROGRAM
Payer: COMMERCIAL

## 2022-10-21 ENCOUNTER — ANESTHESIA (OUTPATIENT)
Dept: ENDOSCOPY | Age: 32
End: 2022-10-21
Payer: COMMERCIAL

## 2022-10-21 ENCOUNTER — ANESTHESIA EVENT (OUTPATIENT)
Dept: ENDOSCOPY | Age: 32
End: 2022-10-21
Payer: COMMERCIAL

## 2022-10-21 VITALS
OXYGEN SATURATION: 98 % | HEIGHT: 65 IN | SYSTOLIC BLOOD PRESSURE: 136 MMHG | DIASTOLIC BLOOD PRESSURE: 89 MMHG | TEMPERATURE: 97.8 F | WEIGHT: 190 LBS | BODY MASS INDEX: 31.65 KG/M2 | HEART RATE: 78 BPM | RESPIRATION RATE: 18 BRPM

## 2022-10-21 DIAGNOSIS — K21.00 GASTROESOPHAGEAL REFLUX DISEASE WITH ESOPHAGITIS, UNSPECIFIED WHETHER HEMORRHAGE: ICD-10-CM

## 2022-10-21 DIAGNOSIS — Z01.812 PRE-OPERATIVE LABORATORY EXAMINATION: Primary | ICD-10-CM

## 2022-10-21 LAB
HCG, URINE, POC: NEGATIVE
Lab: NORMAL
NEGATIVE QC PASS/FAIL: NORMAL
POSITIVE QC PASS/FAIL: NORMAL

## 2022-10-21 PROCEDURE — 7100000011 HC PHASE II RECOVERY - ADDTL 15 MIN: Performed by: STUDENT IN AN ORGANIZED HEALTH CARE EDUCATION/TRAINING PROGRAM

## 2022-10-21 PROCEDURE — 2580000003 HC RX 258: Performed by: STUDENT IN AN ORGANIZED HEALTH CARE EDUCATION/TRAINING PROGRAM

## 2022-10-21 PROCEDURE — 6360000002 HC RX W HCPCS: Performed by: ANESTHESIOLOGIST ASSISTANT

## 2022-10-21 PROCEDURE — 2580000003 HC RX 258: Performed by: ANESTHESIOLOGIST ASSISTANT

## 2022-10-21 PROCEDURE — 88342 IMHCHEM/IMCYTCHM 1ST ANTB: CPT

## 2022-10-21 PROCEDURE — 3609012400 HC EGD TRANSORAL BIOPSY SINGLE/MULTIPLE: Performed by: STUDENT IN AN ORGANIZED HEALTH CARE EDUCATION/TRAINING PROGRAM

## 2022-10-21 PROCEDURE — 2709999900 HC NON-CHARGEABLE SUPPLY: Performed by: STUDENT IN AN ORGANIZED HEALTH CARE EDUCATION/TRAINING PROGRAM

## 2022-10-21 PROCEDURE — 43239 EGD BIOPSY SINGLE/MULTIPLE: CPT | Performed by: STUDENT IN AN ORGANIZED HEALTH CARE EDUCATION/TRAINING PROGRAM

## 2022-10-21 PROCEDURE — 3700000001 HC ADD 15 MINUTES (ANESTHESIA): Performed by: STUDENT IN AN ORGANIZED HEALTH CARE EDUCATION/TRAINING PROGRAM

## 2022-10-21 PROCEDURE — 3700000000 HC ANESTHESIA ATTENDED CARE: Performed by: STUDENT IN AN ORGANIZED HEALTH CARE EDUCATION/TRAINING PROGRAM

## 2022-10-21 PROCEDURE — 88305 TISSUE EXAM BY PATHOLOGIST: CPT

## 2022-10-21 PROCEDURE — 7100000010 HC PHASE II RECOVERY - FIRST 15 MIN: Performed by: STUDENT IN AN ORGANIZED HEALTH CARE EDUCATION/TRAINING PROGRAM

## 2022-10-21 RX ORDER — ONDANSETRON 2 MG/ML
4 INJECTION INTRAMUSCULAR; INTRAVENOUS EVERY 6 HOURS PRN
Status: CANCELLED | OUTPATIENT
Start: 2022-10-21

## 2022-10-21 RX ORDER — SODIUM CHLORIDE 9 MG/ML
INJECTION, SOLUTION INTRAVENOUS CONTINUOUS PRN
Status: DISCONTINUED | OUTPATIENT
Start: 2022-10-21 | End: 2022-10-21 | Stop reason: SDUPTHER

## 2022-10-21 RX ORDER — SODIUM CHLORIDE 0.9 % (FLUSH) 0.9 %
5-40 SYRINGE (ML) INJECTION PRN
Status: DISCONTINUED | OUTPATIENT
Start: 2022-10-21 | End: 2022-10-21 | Stop reason: HOSPADM

## 2022-10-21 RX ORDER — PROPOFOL 10 MG/ML
INJECTION, EMULSION INTRAVENOUS PRN
Status: DISCONTINUED | OUTPATIENT
Start: 2022-10-21 | End: 2022-10-21 | Stop reason: SDUPTHER

## 2022-10-21 RX ORDER — SODIUM CHLORIDE 9 MG/ML
INJECTION, SOLUTION INTRAVENOUS PRN
Status: CANCELLED | OUTPATIENT
Start: 2022-10-21

## 2022-10-21 RX ORDER — OMEPRAZOLE 40 MG/1
40 CAPSULE, DELAYED RELEASE ORAL
Qty: 60 CAPSULE | Refills: 11 | Status: SHIPPED | OUTPATIENT
Start: 2022-10-21 | End: 2022-11-04 | Stop reason: ALTCHOICE

## 2022-10-21 RX ORDER — ONDANSETRON 4 MG/1
4 TABLET, ORALLY DISINTEGRATING ORAL EVERY 8 HOURS PRN
Status: CANCELLED | OUTPATIENT
Start: 2022-10-21

## 2022-10-21 RX ORDER — SODIUM CHLORIDE 9 MG/ML
25 INJECTION, SOLUTION INTRAVENOUS PRN
Status: DISCONTINUED | OUTPATIENT
Start: 2022-10-21 | End: 2022-10-21 | Stop reason: HOSPADM

## 2022-10-21 RX ORDER — SODIUM CHLORIDE 0.9 % (FLUSH) 0.9 %
5-40 SYRINGE (ML) INJECTION EVERY 12 HOURS SCHEDULED
Status: CANCELLED | OUTPATIENT
Start: 2022-10-21

## 2022-10-21 RX ORDER — SODIUM CHLORIDE 0.9 % (FLUSH) 0.9 %
5-40 SYRINGE (ML) INJECTION EVERY 12 HOURS SCHEDULED
Status: DISCONTINUED | OUTPATIENT
Start: 2022-10-21 | End: 2022-10-21 | Stop reason: HOSPADM

## 2022-10-21 RX ORDER — LIDOCAINE HYDROCHLORIDE 20 MG/ML
INJECTION, SOLUTION INTRAVENOUS PRN
Status: DISCONTINUED | OUTPATIENT
Start: 2022-10-21 | End: 2022-10-21 | Stop reason: SDUPTHER

## 2022-10-21 RX ORDER — SODIUM CHLORIDE 0.9 % (FLUSH) 0.9 %
5-40 SYRINGE (ML) INJECTION PRN
Status: CANCELLED | OUTPATIENT
Start: 2022-10-21

## 2022-10-21 RX ADMIN — PROPOFOL 140 MCG/KG/MIN: 10 INJECTION, EMULSION INTRAVENOUS at 08:59

## 2022-10-21 RX ADMIN — LIDOCAINE HYDROCHLORIDE 60 MG: 20 INJECTION, SOLUTION INTRAVENOUS at 08:58

## 2022-10-21 RX ADMIN — SODIUM CHLORIDE: 9 INJECTION, SOLUTION INTRAVENOUS at 08:52

## 2022-10-21 RX ADMIN — PROPOFOL 100 MG: 10 INJECTION, EMULSION INTRAVENOUS at 08:58

## 2022-10-21 RX ADMIN — SODIUM CHLORIDE 25 ML: 9 INJECTION, SOLUTION INTRAVENOUS at 08:41

## 2022-10-21 ASSESSMENT — LIFESTYLE VARIABLES: SMOKING_STATUS: 1

## 2022-10-21 ASSESSMENT — PAIN SCALES - GENERAL
PAINLEVEL_OUTOF10: 0
PAINLEVEL_OUTOF10: 0

## 2022-10-21 ASSESSMENT — PAIN - FUNCTIONAL ASSESSMENT: PAIN_FUNCTIONAL_ASSESSMENT: 0-10

## 2022-10-21 NOTE — OP NOTE
Operative Note      Patient: Haresh Taylor  YOB: 1990  MRN: [id number]    Date of Procedure: 10/21/2022    Pre-Op Diagnosis: GERD    Post-Op Diagnosis: Esophagitis, Gastritis, Duodenitis       Procedure(s):  EGD BIOPSY    Surgeon(s):  Carmen Bañuelos MD    Assistant:   * No surgical staff found *    Anesthesia: Monitor Anesthesia Care    Estimated Blood Loss (mL): Minimal    Complications: None    Specimens:   ID Type Source Tests Collected by Time Destination   A : duodenum r/o celiac Tissue Duodenum SURGICAL PATHOLOGY Carmen Bañuelos MD 10/21/2022 0900    B : antrum biopsy r/o H. Pylori Antrum Antrum SURGICAL PATHOLOGY Carmen Bañuelos MD 10/21/2022 2466        Implants:  * No implants in log *      Drains: * No LDAs found *    Indications:  32y/F here for endoscopic evaluation of nausea and GERD. Findings:     LA Grade A Esophagitis. Linear gastritis. Biopsies obtained. Duodenitis of bulb. Biopsies obtained. Detailed Description of Procedure:   Pre-Anesthesia Assessment:  Prior to the procedure, a history and physical was performed and patient medications and allergies were reviewed. The risks, benefits, complications, treatment options and expected outcomes were discussed with the patient. The possibilities of reaction to medication, pulmonary aspiration, perforation of the gastrointestinal tract, bleeding requiring transfusion or operation, respiratory failure requiring placement on a ventilator, and failure to diagnose a condition or identify polyps/lesions were discussed with the patient. All questions were answered and informed consent was obtained. Patient identification and proposed procedure were verified by the physician, the nurse, the anesthesiologist, the anesthetist, and the technician in the preprocedure area. Please see accompanying documentation. All staff in attendance for the performed procedure act in the role of the Chaperone.   After I obtained informed consent, the scope was passed under direct vision. Throughout the procedure, the patient's blood pressure, pulse, and oxygen saturations were monitored continuously. The gastroscope was introduced through the mouth and advanced to the duodenum. The procedure was performed without difficulty. The patient tolerated the procedure well. EGD:  Benign-appearing gastric inlet patches were appreciated in the proximal esophagus. The mucosa of the distal esophagus showed changes of LA Grade A esophagitis at the GE junction. No associated ulceration or stricture was appreciated. The Z-line was slightly irregular and found at 37cm. No hiatal hernia was appreciated. The mucosa of the stomach showed a linear gastritis of the body and antrum with erythema, granularity, and friability. No associated erosions or ulcerations were appreciated. Biopsies for H pylori were performed. The fundus and cardia were carefully inspected in retroflexion and showed normal mucosa. The mucosa of the duodenum showed inflammation with erythema and erosion in the duodenal bulb. No associated ulcers or strictures were appreciated. Biopsies were obtained. The second portion of the duodenum was normal. Changes of diffuse lymphangiectasia with white discoloration were appreciated. Biopsies obtained. The ampulla was normal in appearance and with normal bilious drainage. Recommendations:  -The patient will be observed post procedure until all discharge criteria are met. -Patient has a contact number available for emergencies. The signs and symptoms of potential delayed complications were discussed with the patient.    -Return to normal activities tomorrow. -Written discharge instructions were provided to the patient.    -Await pathology results. - Begin PPI BID therapy.   -Clinic appointment to be scheduled.    Electronically signed by Ariella Rangel MD on 10/21/2022 at 9:09 AM

## 2022-10-21 NOTE — DISCHARGE INSTRUCTIONS
Recommendations:  -The patient will be observed post procedure until all discharge criteria are met. -Patient has a contact number available for emergencies. The signs and symptoms of potential delayed complications were discussed with the patient.    -Return to normal activities tomorrow. -Written discharge instructions were provided to the patient.    -Await pathology results. - Begin PPI BID therapy.   -Clinic appointment to be scheduled.

## 2022-10-21 NOTE — H&P
[facility] ([facility])   Gastroenterology, Hepatology, &  Advanced Endoscopy    H&P      ASSESSMENT AND PLAN:    32y/F presents for evaluation of GERD and nausea. PLAN:  EGD with biopsy today        HISTORY OF PRESENT ILLNESS:      Ms. Kuldeep Lin is a 32y/F who presents today with complaints of nausea and acid reflux. Notice nausea is food specific. It is worse in the mornings. TUMS and Pepto bismol do not satisfy. Recent allergy testing was negative. Maternal aunt and uncle have celiac sprue disease along with 2 cousins. The heartburn is worse when laying down. There is upper abdominal pain with meals on occasion. Patient is a smoker. No routine NSAID use and no alcohol. Bowels alternate between loose and hard x 8 months. There is a long history of constipation that has worsened as of recent. No family history of CRC or IBD. Recent CT scan was negative but suspicious for fatty liver. Past Medical History:        Diagnosis Date    Fatty liver 12/02/2021    Migraines     ocular    Moderately severe depression 11/02/2021    Other hyperlipidemia      Past Surgical History:        Procedure Laterality Date    CARPAL TUNNEL RELEASE Right 2019    WISDOM TOOTH EXTRACTION       Social History:    TOBACCO:   reports that she has been smoking cigarettes. She started smoking about 17 years ago. She has a 16.00 pack-year smoking history. She has never used smokeless tobacco.  ETOH:   reports that she does not currently use alcohol. DRUGS:   reports no history of drug use.   Family History:       Problem Relation Age of Onset    Breast Cancer Mother     Alcohol Abuse Father     Heart Attack Father     Dementia Father     Hypertension Father     Drug Abuse Brother     Diabetes Maternal Grandmother     Breast Cancer Paternal Aunt     Autism Child     Colon Cancer Neg Hx     Ovarian Cancer Neg Hx     Uterine Cancer Neg Hx        No current facility-administered medications for this encounter. Current Outpatient Medications:     sertraline (ZOLOFT) 100 MG tablet, take 1 tablet by mouth once daily (Patient taking differently: at bedtime take 1 tablet by mouth once daily), Disp: 90 tablet, Rfl: 1    atorvastatin (LIPITOR) 20 MG tablet, take 1 tablet by mouth once daily, Disp: 30 tablet, Rfl: 3    polyethylene glycol (MIRALAX) 17 g PACK packet, Take 17 g by mouth daily, Disp: , Rfl:     Multiple Vitamins-Minerals (THERAPEUTIC MULTIVITAMIN-MINERALS) tablet, Take 1 tablet by mouth daily, Disp: , Rfl:     Vitamin D, Cholecalciferol, 25 MCG (1000 UT) CAPS, Take by mouth, Disp: , Rfl:     Probiotic Product (PROBIOTIC DAILY PO), Take by mouth, Disp: , Rfl:     famotidine (PEPCID) 20 MG tablet, Take 1 tablet by mouth 2 times daily PRN for breakthrough reflux symptoms, Disp: 180 tablet, Rfl: 1    etonogestrel (NEXPLANON) 68 MG implant, 68 mg by Subdermal route once, Disp: , Rfl:      Allergies:  Patient has no known allergies. ROS:  General: Patient denies n/v/f/c or weight loss. HEENT: Patient denies persistent postnasal drip, scleral icterus, drooling, persistent bleeding from nose/mouth. Resp: Patient denies SOB, wheezing, productive cough. Cards: Patient denies CP, palpitations, significant edema  GI: As above. Derm: Patient denies jaundice/rashes. Musc: Patient denies diffuse/irregular joint swelling or myalgias. PHYSICAL EXAM:  Ht 5' 5\" (1.651 m)   Wt 190 lb (86.2 kg)   BMI 31.62 kg/m²   Physical Exam:  General: Overall well-appearing, NAD  HEENT: PERRLA, EOMI, Anicteric sclera, MMM, no rhinorrhea  Cards: RRR, no LE edema  Resp: Breathing comfortably on room air, good air movement, no use of accessory muscles, no audible wheezing  Abdomen: soft, NT, ND. Extremities: Moves all extremities, no effusions or bruising.   Skin: No rashes or jaundice  Neuro: A&O x 3, CN grossly intact, non-focal exam               Electronically signed by Itz Conley MD on 10/21/2022 at 5:22 AM

## 2022-10-21 NOTE — ANESTHESIA POSTPROCEDURE EVALUATION
Department of Anesthesiology  Postprocedure Note    Patient: Lenore Cleary  MRN: [id number]  YOB: 1990  Date of evaluation: 10/21/2022      Procedure Summary     Date: 10/21/22 Room / Location: [facility] / [facility]    Anesthesia Start: 5594 Anesthesia Stop:     Procedure: EGD ESOPHAGOGASTRODUODENOSCOPY Diagnosis:       Gastroesophageal reflux disease with esophagitis, unspecified whether hemorrhage      (GERD)    Surgeons: Rhett Figueroa MD Responsible Provider: Hollie Borrero MD    Anesthesia Type: MAC ASA Status: 2          Anesthesia Type: MAC    Roseline Phase I: Roseline Score: 10    Roseline Phase II:        Anesthesia Post Evaluation    Patient location during evaluation: PACU  Patient participation: complete - patient participated  Level of consciousness: awake  Pain score: 3  Airway patency: patent  Nausea & Vomiting: no nausea and no vomiting  Complications: no  Cardiovascular status: blood pressure returned to baseline  Respiratory status: acceptable  Hydration status: euvolemic

## 2022-10-21 NOTE — PROGRESS NOTES
Discharge instructions reviewed with patient. patient verbalized understanding,no questions regarding instructions.
Pt aware of new arrival time for tomorrow. Arrival time is now 0800. Pt verbalized understanding.
Spoke with Maxcine Bumpers at Dr. Sae Trejo office to notify her that patient tested positive for covid and plans on calling to cancel procedure scheduled on 5/6.
Spoke with patient regarding upcoming procedure. She stated that \"she hasn't felt well and just took a covid test that showed she was positive. \"  The patient was instructed to self isolate and to notify Dr. Lucius Traylor office. Phone number to office given to patient.
insulin the day before surgery. [] If you are diabetic and your blood sugar is low or you feel symptomatic, you may drink 1-2 ounces of apple juice or take a glucose tablet.            -The morning of your procedure, you may call the pre-op area if you have concerns about your blood sugar [phone]. [] Use your inhalers the morning of surgery. Bring your emergency inhaler with you day of surgery. [x] Follow physician instructions regarding any blood thinners you may be taking. WHAT TO EXPECT:    [x] The day of your procedure you will be greeted and checked in by the [facility].  In addition, you will be registered in the [facility] by a Patient Access Representative. Please bring your photo ID and insurance card. A nurse will greet you in accordance to the time you are needed in the pre-op area to prepare you for surgery. Please do not be discouraged if you are not greeted in the order you arrive as there are many variables that are involved in patient preparation. Your patience is greatly appreciated as you wait for your nurse. Please bring in items such as: books, magazines, newspapers, electronics, or any other items  to occupy your time in the waiting area. [x]  Delays may occur. Staff will make a sincere effort to keep you informed of delays. If any delays occur with your procedure, we apologize ahead of time for your inconvenience as we recognize the value of your time.

## 2022-10-21 NOTE — ANESTHESIA PRE PROCEDURE
Department of Anesthesiology  Preprocedure Note       Name:  Kadeem Lo   Age:  27 y.o.  :  1990                                          MRN:  [id number]         Date:  10/21/2022      Surgeon: Katey Smith):  Adrian Das MD    Procedure: Procedure(s):  EGD ESOPHAGOGASTRODUODENOSCOPY    Medications prior to admission:   Prior to Admission medications    Medication Sig Start Date End Date Taking?  Authorizing Provider   sertraline (ZOLOFT) 100 MG tablet take 1 tablet by mouth once daily  Patient taking differently: at bedtime take 1 tablet by mouth once daily 8/10/22   Shari Grimes MD   atorvastatin (LIPITOR) 20 MG tablet take 1 tablet by mouth once daily 22   Trixie Sevilla MD   polyethylene glycol (MIRALAX) 17 g PACK packet Take 17 g by mouth daily    Historical Provider, MD   Multiple Vitamins-Minerals (THERAPEUTIC MULTIVITAMIN-MINERALS) tablet Take 1 tablet by mouth daily    Historical Provider, MD   Vitamin D, Cholecalciferol, 25 MCG (1000 UT) CAPS Take by mouth    Historical Provider, MD   Probiotic Product (PROBIOTIC DAILY PO) Take by mouth    Historical Provider, MD   famotidine (PEPCID) 20 MG tablet Take 1 tablet by mouth 2 times daily PRN for breakthrough reflux symptoms 21   Irma Higgins, APRN - CNP   etonogestrel (Silvia Duck) 68 MG implant 68 mg by Subdermal route once implanted    Historical Provider, MD       Current medications:    Current Facility-Administered Medications   Medication Dose Route Frequency Provider Last Rate Last Admin    sodium chloride flush 0.9 % injection 5-40 mL  5-40 mL IntraVENous 2 times per day Adrian Das MD        sodium chloride flush 0.9 % injection 5-40 mL  5-40 mL IntraVENous PRN Adrian Das MD        0.9 % sodium chloride infusion  25 mL IntraVENous PRN Adrian Das  mL/hr at 10/21/22 0841 25 mL at 10/21/22 0841       Allergies:  No Known Allergies    Problem List:    Patient Active Problem List   Diagnosis Code    Moderately severe depression F32. A    Moderate anxiety F41.9    BMI 30.0-30.9,adult Z68.30    Low vitamin D level R79.89    Fatty liver K76.0    Mixed hyperlipidemia E78.2    Family history of colitis Z83.79    H/O degenerative disc disease Z87.39    Nicotine dependence, uncomplicated E48.921    Family history of melanoma Z80.10    Family history of heart disease Z82.49    Marijuana use F12.90    Vitamin D deficiency E55.9    Migraine with aura, not intractable, without status migrainosus G43. 109    Palpitations R00.2    Moderate obesity E66.8    Family history of early CAD Z80.55    Pure hypercholesterolemia E78.00    Atypical chest pain R07.89    Gastroesophageal reflux disease K21.9    Constipation K59.00       Past Medical History:        Diagnosis Date    Fatty liver 12/02/2021    Migraines     ocular    Moderately severe depression 11/02/2021    Other hyperlipidemia        Past Surgical History:        Procedure Laterality Date    CARPAL TUNNEL RELEASE Right 2019    WISDOM TOOTH EXTRACTION         Social History:    Social History     Tobacco Use    Smoking status: Every Day     Packs/day: 1.00     Years: 16.00     Pack years: 16.00     Types: Cigarettes     Start date: 1/1/2005    Smokeless tobacco: Never    Tobacco comments:     11/2021 - smokes 1 PPD   Substance Use Topics    Alcohol use: Not Currently     Comment: 3-4 times a year                                Ready to quit: Not Answered  Counseling given: Not Answered  Tobacco comments: 11/2021 - smokes 1 PPD      Vital Signs (Current):   Vitals:    10/17/22 1233 10/21/22 0824   BP:  118/65   Pulse:  76   Resp:  16   Temp:  36.2 °C (97.2 °F)   TempSrc:  Temporal   SpO2:  98%   Weight: 190 lb (86.2 kg) 190 lb (86.2 kg)   Height: 5' 5\" (1.651 m) 5' 5\" (1.651 m)                                              BP Readings from Last 3 Encounters:   10/21/22 118/65   02/17/22 124/80   12/22/21 114/72       NPO Status: Time of last liquid consumption: 2230                        Time of last solid consumption: 1930                        Date of last liquid consumption: 10/20/22                        Date of last solid food consumption: 10/20/22    BMI:   Wt Readings from Last 3 Encounters:   10/21/22 190 lb (86.2 kg)   02/17/22 189 lb (85.7 kg)   12/22/21 187 lb (84.8 kg)     Body mass index is 31.62 kg/m². CBC:   Lab Results   Component Value Date/Time    WBC 9.9 11/02/2021 02:00 PM    RBC 4.91 11/02/2021 02:00 PM    HGB 16.3 11/02/2021 02:00 PM    HCT 47.9 11/02/2021 02:00 PM    MCV 97.6 11/02/2021 02:00 PM    RDW 11.9 11/02/2021 02:00 PM     11/02/2021 02:00 PM       CMP:   Lab Results   Component Value Date/Time     11/02/2021 02:00 PM    K 5.2 11/02/2021 02:00 PM     11/02/2021 02:00 PM    CO2 20 11/02/2021 02:00 PM    BUN 11 11/02/2021 02:00 PM    CREATININE 0.7 11/02/2021 02:00 PM    GFRAA >60 11/02/2021 02:00 PM    LABGLOM >60 11/02/2021 02:00 PM    GLUCOSE 114 11/02/2021 02:00 PM    PROT 7.7 12/17/2021 10:24 AM    CALCIUM 9.7 11/02/2021 02:00 PM    BILITOT 0.2 12/17/2021 10:24 AM    ALKPHOS 98 12/17/2021 10:24 AM    AST 18 12/17/2021 10:24 AM    ALT 12 12/17/2021 10:24 AM       POC Tests: No results for input(s): POCGLU, POCNA, POCK, POCCL, POCBUN, POCHEMO, POCHCT in the last 72 hours.     Coags: No results found for: PROTIME, INR, APTT    HCG (If Applicable):   Lab Results   Component Value Date    PREGTESTUR negative 02/17/2022        ABGs: No results found for: PHART, PO2ART, AHK6RAG, UCU3MVF, BEART, S4VLPRJN     Type & Screen (If Applicable):  Lab Results   Component Value Date    LABABO O POS 03/09/2016       Drug/Infectious Status (If Applicable):  No results found for: HIV, HEPCAB    COVID-19 Screening (If Applicable): No results found for: COVID19        Anesthesia Evaluation  Patient summary reviewed  Airway: Mallampati: III  TM distance: >3 FB   Neck ROM: full  Mouth opening: > = 3 FB   Dental: normal exam         Pulmonary: breath sounds clear to auscultation  (+) current smoker          Patient smoked on day of surgery. Cardiovascular:  Exercise tolerance: good (>4 METS),         ECG reviewed  Rhythm: regular  Rate: normal           Beta Blocker:  Not on Beta Blocker         Neuro/Psych:               GI/Hepatic/Renal:   (+) GERD: no interval change, liver disease:,           Endo/Other:                     Abdominal:             Vascular: Other Findings:           Anesthesia Plan      MAC     ASA 2       Induction: intravenous. Anesthetic plan and risks discussed with patient. Plan discussed with attending. Npo>8hrs  Neg HCG      Barbra Dowling 75   10/21/2022    Pt seen, examined, chart reviewed, plan discussed.   Magdiel Ely MD  10/21/2022  9:01 AM

## 2022-11-01 ENCOUNTER — TELEPHONE (OUTPATIENT)
Dept: CARDIOLOGY CLINIC | Age: 32
End: 2022-11-01

## 2022-11-01 RX ORDER — ATORVASTATIN CALCIUM 20 MG/1
TABLET, FILM COATED ORAL
Qty: 30 TABLET | Refills: 3 | Status: SHIPPED | OUTPATIENT
Start: 2022-11-01

## 2022-11-01 NOTE — TELEPHONE ENCOUNTER
----- Message from Monica Husain MD sent at 11/1/2022  9:00 AM EDT -----  Please contact patient and inform her that I filled her prescription this time but based on her having no cardiovascular issues that further refills need to come from her primary care physician

## 2022-11-04 ENCOUNTER — OFFICE VISIT (OUTPATIENT)
Dept: GASTROENTEROLOGY | Age: 32
End: 2022-11-04
Payer: COMMERCIAL

## 2022-11-04 VITALS
HEART RATE: 83 BPM | OXYGEN SATURATION: 98 % | BODY MASS INDEX: 30.82 KG/M2 | RESPIRATION RATE: 16 BRPM | DIASTOLIC BLOOD PRESSURE: 83 MMHG | TEMPERATURE: 97 F | SYSTOLIC BLOOD PRESSURE: 118 MMHG | HEIGHT: 65 IN | WEIGHT: 185 LBS

## 2022-11-04 DIAGNOSIS — K21.00 GASTROESOPHAGEAL REFLUX DISEASE WITH ESOPHAGITIS WITHOUT HEMORRHAGE: ICD-10-CM

## 2022-11-04 DIAGNOSIS — R11.0 NAUSEA: Primary | ICD-10-CM

## 2022-11-04 PROCEDURE — G8427 DOCREV CUR MEDS BY ELIG CLIN: HCPCS | Performed by: NURSE PRACTITIONER

## 2022-11-04 PROCEDURE — G8484 FLU IMMUNIZE NO ADMIN: HCPCS | Performed by: NURSE PRACTITIONER

## 2022-11-04 PROCEDURE — 4004F PT TOBACCO SCREEN RCVD TLK: CPT | Performed by: NURSE PRACTITIONER

## 2022-11-04 PROCEDURE — G8417 CALC BMI ABV UP PARAM F/U: HCPCS | Performed by: NURSE PRACTITIONER

## 2022-11-04 PROCEDURE — 99212 OFFICE O/P EST SF 10 MIN: CPT | Performed by: NURSE PRACTITIONER

## 2022-11-04 RX ORDER — MELOXICAM 7.5 MG/1
7.5 TABLET ORAL DAILY
Qty: 90 TABLET | Refills: 1 | Status: SHIPPED | OUTPATIENT
Start: 2022-11-04

## 2022-11-04 RX ORDER — PANTOPRAZOLE SODIUM 40 MG/1
40 TABLET, DELAYED RELEASE ORAL
Qty: 180 TABLET | Refills: 1 | Status: SHIPPED | OUTPATIENT
Start: 2022-11-04

## 2022-11-04 NOTE — PROGRESS NOTES
Sandra Galloway (:  1990) is a 27 y.o. female, here for evaluation of the following chief complaint(s):  Follow-up (EGD Follow up )      SUBJECTIVE/OBJECTIVE:  HPI:    Marcel Yao is a very pleasant  27year old female that presents today for follow up on EGD. Findings:      LA Grade A Esophagitis. Linear gastritis. Biopsies obtained. Duodenitis of bulb. Biopsies obtained. Diagnosis:   A. Duodenum, biopsy:   Chronic peptic duodenitis. Architecture intact with appropriate villous crypt length ratios. Negative for increased intraepithelial lymphocytes. B.  Stomach, antrum, biopsy:   Reactive gastropathy change, with superimposed mild chronic gastritis. Immunostain for Helicobacter pylori organisms is negative. Negative for intestinal metaplasia. Patient is taking Omeprazole 40 mg twice daily along with famotidine 20 mg twice daily. Feels the omeprazole is making her \"queasy\"  Takes Ibuprofen most days of the week for chronic joint pain  Patient is a smoker but currently working on cessation  No alcohol intake  Does drink coffee        ROS:  General: Patient denies n/v/f/c or weight loss. HEENT: Patient denies persistent postnasal drip, scleral icterus, drooling, persistent bleeding from nose/mouth. Resp: Patient denies SOB, wheezing, productive cough. Cards: Patient denies CP, palpitations, significant edema  GI: As above. Derm: Patient denies jaundice/rashes. Musc: Patient denies diffuse/irregular joint swelling or myalgias.       Objective   Wt Readings from Last 3 Encounters:   22 185 lb (83.9 kg)   10/21/22 190 lb (86.2 kg)   22 189 lb (85.7 kg)     Temp Readings from Last 3 Encounters:   22 97 °F (36.1 °C) (Temporal)   10/21/22 97.8 °F (36.6 °C)   22 98 °F (36.7 °C)     BP Readings from Last 3 Encounters:   22 118/83   10/21/22 136/89   22 124/80     Pulse Readings from Last 3 Encounters:   22 83   10/21/22 78   22 87 Physical Exam    Past Medical History:   Diagnosis Date    Fatty liver 12/02/2021    Migraines     ocular    Moderately severe depression 11/02/2021    Other hyperlipidemia       Past Surgical History:   Procedure Laterality Date    CARPAL TUNNEL RELEASE Right 2019    UPPER GASTROINTESTINAL ENDOSCOPY N/A 10/21/2022    EGD BIOPSY performed by Ariella Rangel MD at [address]        Family History   Problem Relation Age of Onset    Breast Cancer Mother     Alcohol Abuse Father     Heart Attack Father     Dementia Father     Hypertension Father     Drug Abuse Brother     Diabetes Maternal Grandmother     Breast Cancer Paternal Aunt     Autism Child     Colon Cancer Neg Hx     Ovarian Cancer Neg Hx     Uterine Cancer Neg Hx         Lab Results   Component Value Date    WBC 9.9 11/02/2021    HGB 16.3 (H) 11/02/2021    HCT 47.9 11/02/2021    MCV 97.6 11/02/2021     11/02/2021      Lab Results   Component Value Date     11/02/2021    K 5.2 (H) 11/02/2021     11/02/2021    CO2 20 (L) 11/02/2021    BUN 11 11/02/2021    CREATININE 0.7 11/02/2021    GLUCOSE 114 (H) 11/02/2021    CALCIUM 9.7 11/02/2021    PROT 7.7 12/17/2021    LABALBU 4.9 12/17/2021    BILITOT 0.2 12/17/2021    ALKPHOS 98 12/17/2021    AST 18 12/17/2021    ALT 12 12/17/2021    LABGLOM >60 11/02/2021    GFRAA >60 11/02/2021                       ASSESSMENT/PLAN:    1. Nausea  2. Gastroesophageal reflux disease with esophagitis without hemorrhage    -EGD and pathology reviewed today  -will change Omeprazole to Pantoprazole due to patients queasiness. She will continue Famotidine 20mg twice daily  -support offered for smoking cessation  -patient will follow up in January. Will consider weaning from PPI medication at that time. Side effects of PPI medication discussed    Return for Follow up in January. An electronic signature was used to authenticate this note.     --Cece Marion, APRN - CNP

## 2022-12-09 ENCOUNTER — HOSPITAL ENCOUNTER (EMERGENCY)
Age: 32
Discharge: HOME | End: 2022-12-09
Payer: COMMERCIAL

## 2022-12-09 VITALS
DIASTOLIC BLOOD PRESSURE: 85 MMHG | HEIGHT: 65 IN | BODY MASS INDEX: 31.65 KG/M2 | HEART RATE: 68 BPM | WEIGHT: 190 LBS | TEMPERATURE: 98 F | OXYGEN SATURATION: 98 % | SYSTOLIC BLOOD PRESSURE: 116 MMHG | RESPIRATION RATE: 18 BRPM

## 2022-12-09 DIAGNOSIS — G56.00 CARPAL TUNNEL SYNDROME, UNSPECIFIED LATERALITY: Primary | ICD-10-CM

## 2022-12-09 PROCEDURE — 99211 OFF/OP EST MAY X REQ PHY/QHP: CPT

## 2022-12-09 RX ORDER — METHYLPREDNISOLONE 4 MG/1
TABLET ORAL
Qty: 21 TABLET | Refills: 0 | Status: SHIPPED | OUTPATIENT
Start: 2022-12-09 | End: 2022-12-15

## 2022-12-09 ASSESSMENT — PAIN DESCRIPTION - LOCATION: LOCATION: HAND

## 2022-12-09 ASSESSMENT — PAIN - FUNCTIONAL ASSESSMENT: PAIN_FUNCTIONAL_ASSESSMENT: 0-10

## 2022-12-09 ASSESSMENT — PAIN SCALES - GENERAL: PAINLEVEL_OUTOF10: 9

## 2022-12-09 NOTE — ED PROVIDER NOTES
Department of Emergency Medicine  [address] [place] [address]  Provider Note  Admit Date/Time: 2022  3:45 PM  Room:   NAME: Claire Whitlock  : 1990  MRN: [id number]     Chief Complaint:  Wrist Pain (Carpel tunnel left wrist   pain fingers numb)    History of Present Illness        Deidra Poole is a 27 y.o. female who has a past medical history of:   Past Medical History:   Diagnosis Date    Fatty liver 2021    Migraines     ocular    Moderately severe depression 2021    Other hyperlipidemia     presents to the urgent care center by private car for evaluation. She has been having numbness and tingling in her left hand and fingers. She said that she believes that it is carpal tunnel. She said she had it on the right side and had a repair /release done a few years ago and that fixed that one but now she is having problems with the left hand. This has  been going on for about 8 weeks. ROS    Pertinent positives and negatives are stated within HPI, all other systems reviewed and are negative. Past Surgical History:   Procedure Laterality Date    CARPAL TUNNEL RELEASE Right 2019    UPPER GASTROINTESTINAL ENDOSCOPY N/A 10/21/2022    EGD BIOPSY performed by Mike Hickey MD at [place] [address]     Social History:  reports that she has been smoking cigarettes. She started smoking about 17 years ago. She has a 16.00 pack-year smoking history. She has never used smokeless tobacco. She reports that she does not currently use alcohol. She reports that she does not use drugs. Family History: family history includes Alcohol Abuse in her father; Autism in her child; Breast Cancer in her mother and paternal aunt; Dementia in her father; Diabetes in her maternal grandmother; Drug Abuse in her brother; Heart Attack in her father; Hypertension in her father. Allergies: Patient has no known allergies.     Physical Exam   Oxygen Saturation Interpretation: Normal.   ED Triage Vitals [12/09/22 1546]   BP Temp Temp src Heart Rate Resp SpO2 Height Weight   116/85 98 °F (36.7 °C) -- 68 18 98 % 5' 5\" (1.651 m) 190 lb (86.2 kg)       Physical Exam  Constitutional/General: Alert and oriented x3, well appearing, non toxic in NAD  HEENT:  NC/NT. Neck: Supple, full ROM,   Respiratory:  Not in respiratory distress  CV:  Regular rate. Musculoskeletal: Moves all extremities x 4 she has full range of motion of the left wrist.  She has a normal radial pulse present. She has nail polish on so I could not assess capillary refill. .  But she has normal color and warmth to her fingers. She has decreased sensation in the inner aspects of her fingers. She has full range of motion. There is no edema and there is no discoloration of her fingers. Phalen sign is negative but she is positive on Tinel's. Integument: skin warm and dry. No rashes. Lymphatic: no lymphadenopathy noted  Neurologic: GCS 15, no focal deficits,  Psychiatric: Normal Affect    Lab / Imaging Results   (All laboratory and radiology results have been personally reviewed by myself)  Labs:  No results found for this visit on 12/09/22. Imaging: All Radiology results interpreted by Radiologist unless otherwise noted. No orders to display       ED Course / Medical Decision Making   Medications - No data to display       Consult(s):   None        MDM:   Most  likely this is a carpal tunnel syndrome she is positive for Tinel's is positive but Phalen's is negative. She has a normal pulse and normal color and warmth to the fingers. I did refer to orthopedics for further evaluation. I did put her in a wrist splint and also ordered a Medrol Dosepak. If she has any discoloration of her fingers or worsening symptoms she should get reevaluated. Assessment      1.  Carpal tunnel syndrome, unspecified laterality      Plan   Discharge to home and advised to contact Tomi Garcia, [address]  [place] [address]  [phone]    Schedule an appointment as soon as possible for a visit      Patient condition is good    New Medications     New Prescriptions    METHYLPREDNISOLONE (MEDROL, ASHVIN,) 4 MG TABLET    Take as directed on package insert days 1-6     Electronically signed by AMANDA Alvarez CNP   DD: 12/9/22  **This report was transcribed using voice recognition software. Every effort was made to ensure accuracy; however, inadvertent computerized transcription errors may be present.   END OF ED PROVIDER NOTE       AMANDA Alvarez CNP  12/09/22 5151 [facility], APRN - CNP  12/09/22 9411

## 2024-08-14 RX ORDER — PANTOPRAZOLE SODIUM 40 MG/1
80 TABLET, DELAYED RELEASE ORAL
Qty: 180 TABLET | Refills: 1 | OUTPATIENT
Start: 2024-08-14

## 2024-08-14 RX ORDER — MELOXICAM 7.5 MG/1
7.5 TABLET ORAL DAILY
Qty: 90 TABLET | Refills: 1 | OUTPATIENT
Start: 2024-08-14

## (undated) DEVICE — DEFENDO AIR WATER SUCTION AND BIOPSY VALVE KIT FOR  OLYMPUS: Brand: DEFENDO AIR/WATER/SUCTION AND BIOPSY VALVE

## (undated) DEVICE — SPONGE GZ W4XL4IN RAYON POLY FILL CVR W/ NONWOVEN FAB

## (undated) DEVICE — FORCEPS BX L160CM JAW DIA2.4MM YEL L CAP W/ NDL DISP RAD

## (undated) DEVICE — CONTAINER SPEC 60ML PH 7NEUTRAL BUFF FRMLN RDY TO USE

## (undated) DEVICE — BITEBLOCK 54FR W/ DENT RIM BLOX

## (undated) DEVICE — Z DISCONTINUED NO SUB IDED TUBING ETCO2 AD L6.5FT NSL ORAL CVD PRNG NONFLARED TIP OVR